# Patient Record
Sex: FEMALE | Employment: OTHER | URBAN - METROPOLITAN AREA
[De-identification: names, ages, dates, MRNs, and addresses within clinical notes are randomized per-mention and may not be internally consistent; named-entity substitution may affect disease eponyms.]

---

## 2018-02-03 ENCOUNTER — APPOINTMENT (OUTPATIENT)
Dept: CT IMAGING | Age: 68
End: 2018-02-03
Attending: PHYSICIAN ASSISTANT
Payer: MEDICARE

## 2018-02-03 ENCOUNTER — HOSPITAL ENCOUNTER (EMERGENCY)
Age: 68
Discharge: HOME | End: 2018-02-03
Attending: EMERGENCY MEDICINE
Payer: MEDICARE

## 2018-02-03 VITALS
OXYGEN SATURATION: 95 % | TEMPERATURE: 98.6 F | WEIGHT: 178 LBS | RESPIRATION RATE: 20 BRPM | HEART RATE: 85 BPM | HEIGHT: 63 IN | BODY MASS INDEX: 31.54 KG/M2 | DIASTOLIC BLOOD PRESSURE: 66 MMHG | SYSTOLIC BLOOD PRESSURE: 165 MMHG

## 2018-02-03 DIAGNOSIS — R51.9 ACUTE NONINTRACTABLE HEADACHE, UNSPECIFIED HEADACHE TYPE: Primary | ICD-10-CM

## 2018-02-03 LAB
ALBUMIN SERPL-MCNC: 3.7 G/DL (ref 3.4–5)
ALBUMIN/GLOB SERPL: 0.8 {RATIO} (ref 0.8–1.7)
ALP SERPL-CCNC: 161 U/L (ref 45–117)
ALT SERPL-CCNC: 36 U/L (ref 13–56)
ANION GAP SERPL CALC-SCNC: 10 MMOL/L (ref 3–18)
APPEARANCE UR: CLEAR
AST SERPL-CCNC: 24 U/L (ref 15–37)
BACTERIA URNS QL MICRO: ABNORMAL /HPF
BASOPHILS # BLD: 0 K/UL (ref 0–0.06)
BASOPHILS NFR BLD: 0 % (ref 0–2)
BILIRUB DIRECT SERPL-MCNC: 0.2 MG/DL (ref 0–0.2)
BILIRUB SERPL-MCNC: 0.8 MG/DL (ref 0.2–1)
BILIRUB UR QL: NEGATIVE
BUN SERPL-MCNC: 9 MG/DL (ref 7–18)
BUN/CREAT SERPL: 8 (ref 12–20)
CALCIUM SERPL-MCNC: 9.6 MG/DL (ref 8.5–10.1)
CHLORIDE SERPL-SCNC: 98 MMOL/L (ref 100–108)
CO2 SERPL-SCNC: 30 MMOL/L (ref 21–32)
COLOR UR: YELLOW
CREAT SERPL-MCNC: 1.06 MG/DL (ref 0.6–1.3)
DIFFERENTIAL METHOD BLD: ABNORMAL
EOSINOPHIL # BLD: 0 K/UL (ref 0–0.4)
EOSINOPHIL NFR BLD: 1 % (ref 0–5)
EPITH CASTS URNS QL MICRO: ABNORMAL /LPF (ref 0–5)
ERYTHROCYTE [DISTWIDTH] IN BLOOD BY AUTOMATED COUNT: 14.4 % (ref 11.6–14.5)
GLOBULIN SER CALC-MCNC: 4.7 G/DL (ref 2–4)
GLUCOSE SERPL-MCNC: 299 MG/DL (ref 74–99)
GLUCOSE UR STRIP.AUTO-MCNC: >1000 MG/DL
HCT VFR BLD AUTO: 42.6 % (ref 35–45)
HGB BLD-MCNC: 13.7 G/DL (ref 12–16)
HGB UR QL STRIP: NEGATIVE
KETONES UR QL STRIP.AUTO: 40 MG/DL
LEUKOCYTE ESTERASE UR QL STRIP.AUTO: NEGATIVE
LYMPHOCYTES # BLD: 1 K/UL (ref 0.9–3.6)
LYMPHOCYTES NFR BLD: 13 % (ref 21–52)
MAGNESIUM SERPL-MCNC: 1.6 MG/DL (ref 1.6–2.6)
MCH RBC QN AUTO: 25.8 PG (ref 24–34)
MCHC RBC AUTO-ENTMCNC: 32.2 G/DL (ref 31–37)
MCV RBC AUTO: 80.4 FL (ref 74–97)
MONOCYTES # BLD: 0.3 K/UL (ref 0.05–1.2)
MONOCYTES NFR BLD: 3 % (ref 3–10)
NEUTS SEG # BLD: 6.4 K/UL (ref 1.8–8)
NEUTS SEG NFR BLD: 83 % (ref 40–73)
NITRITE UR QL STRIP.AUTO: NEGATIVE
PH UR STRIP: 7 [PH] (ref 5–8)
PLATELET # BLD AUTO: 229 K/UL (ref 135–420)
PMV BLD AUTO: 9.8 FL (ref 9.2–11.8)
POTASSIUM SERPL-SCNC: 3.5 MMOL/L (ref 3.5–5.5)
PROT SERPL-MCNC: 8.4 G/DL (ref 6.4–8.2)
PROT UR STRIP-MCNC: 100 MG/DL
RBC # BLD AUTO: 5.3 M/UL (ref 4.2–5.3)
RBC #/AREA URNS HPF: ABNORMAL /HPF (ref 0–5)
SODIUM SERPL-SCNC: 138 MMOL/L (ref 136–145)
SP GR UR REFRACTOMETRY: >1.03 (ref 1–1.03)
UROBILINOGEN UR QL STRIP.AUTO: 0.2 EU/DL (ref 0.2–1)
WBC # BLD AUTO: 7.8 K/UL (ref 4.6–13.2)
WBC URNS QL MICRO: ABNORMAL /HPF (ref 0–4)

## 2018-02-03 PROCEDURE — 80076 HEPATIC FUNCTION PANEL: CPT | Performed by: PHYSICIAN ASSISTANT

## 2018-02-03 PROCEDURE — 70450 CT HEAD/BRAIN W/O DYE: CPT

## 2018-02-03 PROCEDURE — 85025 COMPLETE CBC W/AUTO DIFF WBC: CPT | Performed by: PHYSICIAN ASSISTANT

## 2018-02-03 PROCEDURE — 74011250636 HC RX REV CODE- 250/636: Performed by: PHYSICIAN ASSISTANT

## 2018-02-03 PROCEDURE — 83735 ASSAY OF MAGNESIUM: CPT | Performed by: PHYSICIAN ASSISTANT

## 2018-02-03 PROCEDURE — 96375 TX/PRO/DX INJ NEW DRUG ADDON: CPT

## 2018-02-03 PROCEDURE — 80048 BASIC METABOLIC PNL TOTAL CA: CPT | Performed by: PHYSICIAN ASSISTANT

## 2018-02-03 PROCEDURE — 99282 EMERGENCY DEPT VISIT SF MDM: CPT

## 2018-02-03 PROCEDURE — 96361 HYDRATE IV INFUSION ADD-ON: CPT

## 2018-02-03 PROCEDURE — 96374 THER/PROPH/DIAG INJ IV PUSH: CPT

## 2018-02-03 PROCEDURE — 81001 URINALYSIS AUTO W/SCOPE: CPT | Performed by: PHYSICIAN ASSISTANT

## 2018-02-03 RX ORDER — SODIUM CHLORIDE 9 MG/ML
1000 INJECTION, SOLUTION INTRAVENOUS ONCE
Status: COMPLETED | OUTPATIENT
Start: 2018-02-03 | End: 2018-02-03

## 2018-02-03 RX ORDER — NAPROXEN 375 MG/1
375 TABLET ORAL 2 TIMES DAILY WITH MEALS
Qty: 20 TAB | Refills: 0 | Status: ON HOLD | OUTPATIENT
Start: 2018-02-03 | End: 2019-12-16

## 2018-02-03 RX ORDER — HYDROMORPHONE HYDROCHLORIDE 1 MG/ML
1 INJECTION, SOLUTION INTRAMUSCULAR; INTRAVENOUS; SUBCUTANEOUS ONCE
Status: COMPLETED | OUTPATIENT
Start: 2018-02-03 | End: 2018-02-03

## 2018-02-03 RX ORDER — PROMETHAZINE HYDROCHLORIDE 25 MG/1
25 TABLET ORAL
Qty: 12 TAB | Refills: 0 | Status: ON HOLD | OUTPATIENT
Start: 2018-02-03 | End: 2019-12-16

## 2018-02-03 RX ORDER — ONDANSETRON 2 MG/ML
4 INJECTION INTRAMUSCULAR; INTRAVENOUS
Status: COMPLETED | OUTPATIENT
Start: 2018-02-03 | End: 2018-02-03

## 2018-02-03 RX ADMIN — HYDROMORPHONE HYDROCHLORIDE 1 MG: 1 INJECTION, SOLUTION INTRAMUSCULAR; INTRAVENOUS; SUBCUTANEOUS at 15:07

## 2018-02-03 RX ADMIN — ONDANSETRON 4 MG: 2 INJECTION INTRAMUSCULAR; INTRAVENOUS at 15:07

## 2018-02-03 RX ADMIN — SODIUM CHLORIDE 1000 ML: 900 INJECTION, SOLUTION INTRAVENOUS at 16:44

## 2018-02-03 NOTE — DISCHARGE INSTRUCTIONS
54     Headache: Care Instructions  Your Care Instructions    Headaches have many possible causes. Most headaches aren't a sign of a more serious problem, and they will get better on their own. Home treatment may help you feel better faster. The doctor has checked you carefully, but problems can develop later. If you notice any problems or new symptoms, get medical treatment right away. Follow-up care is a key part of your treatment and safety. Be sure to make and go to all appointments, and call your doctor if you are having problems. It's also a good idea to know your test results and keep a list of the medicines you take. How can you care for yourself at home? · Do not drive if you have taken a prescription pain medicine. · Rest in a quiet, dark room until your headache is gone. Close your eyes and try to relax or go to sleep. Don't watch TV or read. · Put a cold, moist cloth or cold pack on the painful area for 10 to 20 minutes at a time. Put a thin cloth between the cold pack and your skin. · Use a warm, moist towel or a heating pad set on low to relax tight shoulder and neck muscles. · Have someone gently massage your neck and shoulders. · Take pain medicines exactly as directed. ¨ If the doctor gave you a prescription medicine for pain, take it as prescribed. ¨ If you are not taking a prescription pain medicine, ask your doctor if you can take an over-the-counter medicine. · Be careful not to take pain medicine more often than the instructions allow, because you may get worse or more frequent headaches when the medicine wears off. · Do not ignore new symptoms that occur with a headache, such as a fever, weakness or numbness, vision changes, or confusion. These may be signs of a more serious problem. To prevent headaches  · Keep a headache diary so you can figure out what triggers your headaches. Avoiding triggers may help you prevent headaches.  Record when each headache began, how long it lasted, and what the pain was like (throbbing, aching, stabbing, or dull). Write down any other symptoms you had with the headache, such as nausea, flashing lights or dark spots, or sensitivity to bright light or loud noise. Note if the headache occurred near your period. List anything that might have triggered the headache, such as certain foods (chocolate, cheese, wine) or odors, smoke, bright light, stress, or lack of sleep. · Find healthy ways to deal with stress. Headaches are most common during or right after stressful times. Take time to relax before and after you do something that has caused a headache in the past.  · Try to keep your muscles relaxed by keeping good posture. Check your jaw, face, neck, and shoulder muscles for tension, and try relaxing them. When sitting at a desk, change positions often, and stretch for 30 seconds each hour. · Get plenty of sleep and exercise. · Eat regularly and well. Long periods without food can trigger a headache. · Treat yourself to a massage. Some people find that regular massages are very helpful in relieving tension. · Limit caffeine by not drinking too much coffee, tea, or soda. But don't quit caffeine suddenly, because that can also give you headaches. · Reduce eyestrain from computers by blinking frequently and looking away from the computer screen every so often. Make sure you have proper eyewear and that your monitor is set up properly, about an arm's length away. · Seek help if you have depression or anxiety. Your headaches may be linked to these conditions. Treatment can both prevent headaches and help with symptoms of anxiety or depression. When should you call for help? Call 911 anytime you think you may need emergency care. For example, call if:  ? · You have signs of a stroke. These may include:  ¨ Sudden numbness, paralysis, or weakness in your face, arm, or leg, especially on only one side of your body. ¨ Sudden vision changes.   ¨ Sudden trouble speaking. ¨ Sudden confusion or trouble understanding simple statements. ¨ Sudden problems with walking or balance. ¨ A sudden, severe headache that is different from past headaches. ?Call your doctor now or seek immediate medical care if:  ? · You have a new or worse headache. ? · Your headache gets much worse. Where can you learn more? Go to http://alfredo-susy.info/. Enter M271 in the search box to learn more about \"Headache: Care Instructions. \"  Current as of: October 14, 2016  Content Version: 11.4  © 7373-8865 [employer], Incorporated. Care instructions adapted under license by [employer] (which disclaims liability or warranty for this information). If you have questions about a medical condition or this instruction, always ask your healthcare professional. [address] any warranty or liability for your use of this information.

## 2018-02-03 NOTE — ED PROVIDER NOTES
EMERGENCY DEPARTMENT HISTORY AND PHYSICAL EXAM    Date: 2/3/2018  Patient Name: Ritika Norwood    History of Presenting Illness     Chief Complaint   Patient presents with    Headache         History Provided By: Patient and Patient's Mother    Chief Complaint: headache  Duration: 2 Weeks  Timing:  Waxing and Waning  Location: right side of head, around right eye  Quality: Pressure  Severity: 10 out of 10  Modifying Factors: none  Associated Symptoms: denies any other associated signs or symptoms      Additional History (Context): Ritika Norwood is a 75 y.o. female with diabetes, hypertension and hyperlipidemia who presents with headache. Initial onset 2 weeks ago. HA was right sided, around the right eye and down the right side of neck. Pt states she went to [facility] and was dx with migraine. HA resolved, then returned this morning and the migraine medications were not working. HA this morning woke her from sleep. She reported photophobia and nausea. Has family hx of migraines. Denies any other associated complaints. PCP: PROVIDER UNKNOWN    Current Outpatient Prescriptions   Medication Sig Dispense Refill    aspirin delayed-release (ECOTRIN LOW STRENGTH) 81 mg tablet Take 81 mg by mouth daily.  pantoprazole (PROTONIX) 20 mg tablet Take 20 mg by mouth.  lisinopril (PRINIVIL, ZESTRIL) 10 mg tablet Take 10 mg by mouth.  atorvastatin (LIPITOR) 80 mg tablet Take 80 mg by mouth.  indomethacin (INDOCIN) 50 mg capsule       metFORMIN ER (GLUCOPHAGE XR) 750 mg tablet Take  by mouth.  pantoprazole (PROTONIX) 40 mg tablet       methocarbamol (ROBAXIN) 500 mg tablet       indomethacin (INDOCIN) 50 mg capsule       mirabegron ER (MYRBETRIQ) 50 mg ER tablet Take 1 Tab by mouth daily. 90 Tab 3    Omeprazole delayed release (PRILOSEC D/R) 20 mg tablet Take 20 mg by mouth daily.  metFORMIN (GLUCOPHAGE) 500 mg tablet Take 500 mg by mouth two (2) times daily (with meals).       insulin (NOVOLOG MIX 70-30 FLEXPEN) 100 unit/mL (70-30) flex pen 7 Units by SubCUTAneous route three (3) times daily (with meals).  insulin glargine (LANTUS) 100 unit/mL injection 30 Units by SubCUTAneous route nightly.  lisinopril-hydrochlorothiazide (PRINZIDE, ZESTORETIC) 20-12.5 mg per tablet Take 2 Tabs by mouth daily. Past History     Past Medical History:  Past Medical History:   Diagnosis Date    Diabetes ([address]     Hypercholesteremia     Hypertension     Nausea & vomiting     UTI (urinary tract infection)        Past Surgical History:  Past Surgical History:   Procedure Laterality Date    HX APPENDECTOMY      HX HYSTERECTOMY      HX ADELA AND BSO         Family History:  No family history on file. Social History:  Social History   Substance Use Topics    Smoking status: Former Smoker     Quit date: 2009    Smokeless tobacco: Never Used    Alcohol use No       Allergies: Allergies   Allergen Reactions    Darvon [Propoxyphene] Nausea and Vomiting    Sulfabenzamide Hives and Rash         Review of Systems   Review of Systems   Constitutional: Negative for chills and fever. HENT: Negative. Respiratory: Negative. Cardiovascular: Negative. Gastrointestinal: Negative. Genitourinary: Negative. Musculoskeletal: Negative. Neurological: Positive for headaches. Negative for dizziness and weakness. All other systems reviewed and are negative. All Other Systems Negative  Physical Exam     Vitals:    02/03/18 1414   BP: (!) 178/104   Pulse: 85   Resp: 20   Temp: 98.6 °F (37 °C)   SpO2: 98%   Weight: 80.7 kg (178 lb)   Height: 5' 3\" (1.6 m)     Physical Exam   Constitutional: She is oriented to person, place, and time. She appears well-developed and well-nourished. No distress. HENT:   Head: Normocephalic and atraumatic.    Right Ear: Hearing, tympanic membrane, external ear and ear canal normal.   Left Ear: Hearing, tympanic membrane, external ear and ear canal normal. Nose: Nose normal.   Mouth/Throat: Uvula is midline, oropharynx is clear and moist and mucous membranes are normal.   Eyes: Conjunctivae and EOM are normal. Pupils are equal, round, and reactive to light. Right eye exhibits no discharge. Left eye exhibits no discharge. Neck: Normal range of motion. Neck supple. Cardiovascular: Normal rate and regular rhythm. Pulmonary/Chest: Effort normal and breath sounds normal. She has no wheezes. Musculoskeletal: Normal range of motion. Neurological: She is alert and oriented to person, place, and time. No sensory deficit. She displays a negative Romberg sign. Coordination and gait normal.   Cranial nerves grossly intact   Skin: Skin is warm and dry. She is not diaphoretic. Psychiatric: She has a normal mood and affect. Diagnostic Study Results     Labs -     Recent Results (from the past 12 hour(s))   CBC WITH AUTOMATED DIFF    Collection Time: 02/03/18  3:00 PM   Result Value Ref Range    WBC 7.8 4.6 - 13.2 K/uL    RBC 5.30 4.20 - 5.30 M/uL    HGB 13.7 12.0 - 16.0 g/dL    HCT 42.6 35.0 - 45.0 %    MCV 80.4 74.0 - 97.0 FL    MCH 25.8 24.0 - 34.0 PG    MCHC 32.2 31.0 - 37.0 g/dL    RDW 14.4 11.6 - 14.5 %    PLATELET 858 471 - 743 K/uL    MPV 9.8 9.2 - 11.8 FL    NEUTROPHILS 83 (H) 40 - 73 %    LYMPHOCYTES 13 (L) 21 - 52 %    MONOCYTES 3 3 - 10 %    EOSINOPHILS 1 0 - 5 %    BASOPHILS 0 0 - 2 %    ABS. NEUTROPHILS 6.4 1.8 - 8.0 K/UL    ABS. LYMPHOCYTES 1.0 0.9 - 3.6 K/UL    ABS. MONOCYTES 0.3 0.05 - 1.2 K/UL    ABS. EOSINOPHILS 0.0 0.0 - 0.4 K/UL    ABS. BASOPHILS 0.0 0.0 - 0.06 K/UL    DF AUTOMATED     HEPATIC FUNCTION PANEL    Collection Time: 02/03/18  3:00 PM   Result Value Ref Range    Protein, total 8.4 (H) 6.4 - 8.2 g/dL    Albumin 3.7 3.4 - 5.0 g/dL    Globulin 4.7 (H) 2.0 - 4.0 g/dL    A-G Ratio 0.8 0.8 - 1.7      Bilirubin, total 0.8 0.2 - 1.0 MG/DL    Bilirubin, direct 0.2 0.0 - 0.2 MG/DL    Alk.  phosphatase 161 (H) 45 - 117 U/L    AST (SGOT) 24 15 - 37 U/L    ALT (SGPT) 36 13 - 56 U/L   METABOLIC PANEL, BASIC    Collection Time: 02/03/18  3:00 PM   Result Value Ref Range    Sodium 138 136 - 145 mmol/L    Potassium 3.5 3.5 - 5.5 mmol/L    Chloride 98 (L) 100 - 108 mmol/L    CO2 30 21 - 32 mmol/L    Anion gap 10 3.0 - 18 mmol/L    Glucose 299 (H) 74 - 99 mg/dL    BUN 9 7.0 - 18 MG/DL    Creatinine 1.06 0.6 - 1.3 MG/DL    BUN/Creatinine ratio 8 (L) 12 - 20      GFR est AA >60 >60 ml/min/1.73m2    GFR est non-AA 52 (L) >60 ml/min/1.73m2    Calcium 9.6 8.5 - 10.1 MG/DL   MAGNESIUM    Collection Time: 02/03/18  3:00 PM   Result Value Ref Range    Magnesium 1.6 1.6 - 2.6 mg/dL   URINALYSIS W/ RFLX MICROSCOPIC    Collection Time: 02/03/18  5:20 PM   Result Value Ref Range    Color YELLOW      Appearance CLEAR      Specific gravity >1.030 (H) 1.005 - 1.030    pH (UA) 7.0 5.0 - 8.0      Protein 100 (A) NEG mg/dL    Glucose >1000 (A) NEG mg/dL    Ketone 40 (A) NEG mg/dL    Bilirubin NEGATIVE  NEG      Blood NEGATIVE  NEG      Urobilinogen 0.2 0.2 - 1.0 EU/dL    Nitrites NEGATIVE  NEG      Leukocyte Esterase NEGATIVE  NEG     URINE MICROSCOPIC ONLY    Collection Time: 02/03/18  5:20 PM   Result Value Ref Range    WBC 0 to 3 0 - 4 /hpf    RBC 0 to 3 0 - 5 /hpf    Epithelial cells 2+ 0 - 5 /lpf    Bacteria 1+ (A) NEG /hpf       Radiologic Studies -   CT HEAD WO CONT   Final Result        CT Results  (Last 48 hours)               02/03/18 1518  CT HEAD WO CONT Final result    Impression:  IMPRESSION:            No acute intracranial process. All CT scans at this facility are performed using dose optimization technique as   appropriate to the performed exam, to include automated exposure control,   adjustment of the mA and/or kV according to patient's size (Including   appropriate matching for site-specific examinations), or use of iterative   reconstruction technique. Narrative:  CT OF THE BRAIN       CPT CODE: 47739       COMPARISON: None. INDICATIONS: Right-sided headache. TECHNIQUE: Axial sections through the brain at 5 mm collimation without IV   contrast are obtained. FINDINGS:         The ventricles are of normal size and configuration without midline shift. .   The brain parenchyma is generally of normal attenuation. . Gray-white   differentiation is satisfactory. No acute hemorrhage is evident. .   No mass lesions are evident. There are no pathologic calcifications. .   There are no pathologic fluid collections. .       The visible portions of the paranasal sinuses are clear. .               CXR Results  (Last 48 hours)    None            Medical Decision Making   I am the first provider for this patient. I reviewed the vital signs, available nursing notes, past medical history, past surgical history, family history and social history. Vital Signs-Reviewed the patient's vital signs. Pulse Oximetry Analysis - 98% on RA    Records Reviewed: Nursing Notes    Procedures:  Procedures    Provider Notes (Medical Decision Making):   Pt c/o right sided HA, around right eye initial onset 2 weeks ago, then resolved and occurred again this morning. Pt given dilaudid and zofran prior to my eval due to her apparently having a lot of pain and trouble being still in wheelchair. Pt pain free upon my initial evaluation and re-evaluation. Did state that she was under a lot of stress at home the past several weeks. Labs, CT reassuring. Neuro exam unremarkable. MED RECONCILIATION:  No current facility-administered medications for this encounter. Current Outpatient Prescriptions   Medication Sig    aspirin delayed-release (ECOTRIN LOW STRENGTH) 81 mg tablet Take 81 mg by mouth daily.  pantoprazole (PROTONIX) 20 mg tablet Take 20 mg by mouth.  lisinopril (PRINIVIL, ZESTRIL) 10 mg tablet Take 10 mg by mouth.  atorvastatin (LIPITOR) 80 mg tablet Take 80 mg by mouth.     indomethacin (INDOCIN) 50 mg capsule     metFORMIN ER (GLUCOPHAGE XR) 750 mg tablet Take  by mouth.  pantoprazole (PROTONIX) 40 mg tablet     methocarbamol (ROBAXIN) 500 mg tablet     indomethacin (INDOCIN) 50 mg capsule     mirabegron ER (MYRBETRIQ) 50 mg ER tablet Take 1 Tab by mouth daily.  Omeprazole delayed release (PRILOSEC D/R) 20 mg tablet Take 20 mg by mouth daily.  metFORMIN (GLUCOPHAGE) 500 mg tablet Take 500 mg by mouth two (2) times daily (with meals).  insulin (NOVOLOG MIX 70-30 FLEXPEN) 100 unit/mL (70-30) flex pen 7 Units by SubCUTAneous route three (3) times daily (with meals).  insulin glargine (LANTUS) 100 unit/mL injection 30 Units by SubCUTAneous route nightly.  lisinopril-hydrochlorothiazide (PRINZIDE, ZESTORETIC) 20-12.5 mg per tablet Take 2 Tabs by mouth daily. Disposition:  discharged    DISCHARGE NOTE:     Pt has been reexamined. Pt is feeling better. Has been sleeping since meds were given. Patient has no new complaints, changes, or physical findings. Care plan outlined and precautions discussed. Results of ct and labs were reviewed with the patient. All medications were reviewed with the patient; will d/c home. All of pt's questions and concerns were addressed. Patient was instructed and agrees to follow up with PCP, as well as to return to the ED upon further deterioration. Patient is ready to go home. Follow-up Information     Follow up With Details Comments [address]  For follow-up [address] [facility] [address] [phone] [address] [facility] EMERGENCY DEPT  If symptoms worsen [address] [facility]  [phone]          Current Discharge Medication List      CONTINUE these medications which have NOT CHANGED    Details   aspirin delayed-release (ECOTRIN LOW STRENGTH) 81 mg tablet Take 81 mg by mouth daily.       !! pantoprazole (PROTONIX) 20 mg tablet Take 20 mg by mouth.      lisinopril (PRINIVIL, ZESTRIL) 10 mg tablet Take 10 mg by mouth. atorvastatin (LIPITOR) 80 mg tablet Take 80 mg by mouth. !! indomethacin (INDOCIN) 50 mg capsule       metFORMIN ER (GLUCOPHAGE XR) 750 mg tablet Take  by mouth. !! pantoprazole (PROTONIX) 40 mg tablet       methocarbamol (ROBAXIN) 500 mg tablet       !! indomethacin (INDOCIN) 50 mg capsule       mirabegron ER (MYRBETRIQ) 50 mg ER tablet Take 1 Tab by mouth daily. Qty: 90 Tab, Refills: 3      Omeprazole delayed release (PRILOSEC D/R) 20 mg tablet Take 20 mg by mouth daily. metFORMIN (GLUCOPHAGE) 500 mg tablet Take 500 mg by mouth two (2) times daily (with meals). insulin (NOVOLOG MIX 70-30 FLEXPEN) 100 unit/mL (70-30) flex pen 7 Units by SubCUTAneous route three (3) times daily (with meals). insulin glargine (LANTUS) 100 unit/mL injection 30 Units by SubCUTAneous route nightly. lisinopril-hydrochlorothiazide (PRINZIDE, ZESTORETIC) 20-12.5 mg per tablet Take 2 Tabs by mouth daily. !! - Potential duplicate medications found. Please discuss with provider. Diagnosis     Clinical Impression:   1.  Acute nonintractable headache, unspecified headache type

## 2018-02-03 NOTE — ED TRIAGE NOTES
Pt c/o right sided headache for 2 weeks. [facility] and dx'd with [facility]raines. Pt can't sit still in wheelchair. Keeps acting like she is talking to someone who is not there. Pt states she is talking to GOD.

## 2018-02-04 NOTE — ED NOTES
Pt states ready for discharge. Pt states she will follow up with PCP as instructed by provider. Pt appears in NOAD. I have reviewed discharge instructions with the patient. Prescriptions were reviewed with patient instructed not to drink alcohol, drive a car, or operate heavy machinery while taking this medicine. The patient verbalized understanding. Patient seen leaving ED ambulatory without difficulty or need for assistance, with S/O in no sign of distress. Patient armband removed and shredded    Current Discharge Medication List      START taking these medications    Details   promethazine (PHENERGAN) 25 mg tablet Take 1 Tab by mouth every six (6) hours as needed. Qty: 12 Tab, Refills: 0      naproxen (NAPROSYN) 375 mg tablet Take 1 Tab by mouth two (2) times daily (with meals). Qty: 20 Tab, Refills: 0         CONTINUE these medications which have NOT CHANGED    Details   aspirin delayed-release (ECOTRIN LOW STRENGTH) 81 mg tablet Take 81 mg by mouth daily. !! pantoprazole (PROTONIX) 20 mg tablet Take 20 mg by mouth.      lisinopril (PRINIVIL, ZESTRIL) 10 mg tablet Take 10 mg by mouth. atorvastatin (LIPITOR) 80 mg tablet Take 80 mg by mouth. !! indomethacin (INDOCIN) 50 mg capsule       metFORMIN ER (GLUCOPHAGE XR) 750 mg tablet Take  by mouth. !! pantoprazole (PROTONIX) 40 mg tablet       methocarbamol (ROBAXIN) 500 mg tablet       !! indomethacin (INDOCIN) 50 mg capsule       mirabegron ER (MYRBETRIQ) 50 mg ER tablet Take 1 Tab by mouth daily. Qty: 90 Tab, Refills: 3      Omeprazole delayed release (PRILOSEC D/R) 20 mg tablet Take 20 mg by mouth daily. metFORMIN (GLUCOPHAGE) 500 mg tablet Take 500 mg by mouth two (2) times daily (with meals). insulin (NOVOLOG MIX 70-30 FLEXPEN) 100 unit/mL (70-30) flex pen 7 Units by SubCUTAneous route three (3) times daily (with meals). insulin glargine (LANTUS) 100 unit/mL injection 30 Units by SubCUTAneous route nightly. lisinopril-hydrochlorothiazide (PRINZIDE, ZESTORETIC) 20-12.5 mg per tablet Take 2 Tabs by mouth daily. !! - Potential duplicate medications found. Please discuss with provider.

## 2019-10-17 ENCOUNTER — HOSPITAL ENCOUNTER (EMERGENCY)
Age: 69
Discharge: HOME | End: 2019-10-17
Attending: EMERGENCY MEDICINE
Payer: MEDICARE

## 2019-10-17 VITALS
OXYGEN SATURATION: 100 % | HEART RATE: 70 BPM | TEMPERATURE: 97.7 F | BODY MASS INDEX: 27.83 KG/M2 | DIASTOLIC BLOOD PRESSURE: 88 MMHG | HEIGHT: 64 IN | RESPIRATION RATE: 16 BRPM | SYSTOLIC BLOOD PRESSURE: 156 MMHG | WEIGHT: 163 LBS

## 2019-10-17 DIAGNOSIS — R73.9 HYPERGLYCEMIA: Primary | ICD-10-CM

## 2019-10-17 LAB
ADMINISTERED INITIALS, ADMINIT: NORMAL
ADMINISTERED INITIALS, ADMINIT: NORMAL
ANION GAP SERPL CALC-SCNC: 7 MMOL/L (ref 3–18)
ATRIAL RATE: 73 BPM
BASOPHILS # BLD: 0 K/UL (ref 0–0.1)
BASOPHILS NFR BLD: 1 % (ref 0–2)
BUN SERPL-MCNC: 13 MG/DL (ref 7–18)
BUN/CREAT SERPL: 12 (ref 12–20)
CALCIUM SERPL-MCNC: 9.1 MG/DL (ref 8.5–10.1)
CALCULATED P AXIS, ECG09: 63 DEGREES
CALCULATED R AXIS, ECG10: -16 DEGREES
CALCULATED T AXIS, ECG11: 52 DEGREES
CHLORIDE SERPL-SCNC: 102 MMOL/L (ref 100–111)
CO2 SERPL-SCNC: 25 MMOL/L (ref 21–32)
CREAT SERPL-MCNC: 1.07 MG/DL (ref 0.6–1.3)
D50 ADMINISTERED, D50ADM: 0 ML
D50 ADMINISTERED, D50ADM: 0 ML
D50 ORDER, D50ORD: 0 ML
D50 ORDER, D50ORD: 0 ML
DIAGNOSIS, 93000: NORMAL
DIFFERENTIAL METHOD BLD: NORMAL
EOSINOPHIL # BLD: 0.1 K/UL (ref 0–0.4)
EOSINOPHIL NFR BLD: 1 % (ref 0–5)
ERYTHROCYTE [DISTWIDTH] IN BLOOD BY AUTOMATED COUNT: 13.9 % (ref 11.6–14.5)
GLUCOSE BLD STRIP.AUTO-MCNC: 204 MG/DL (ref 70–110)
GLUCOSE BLD STRIP.AUTO-MCNC: 373 MG/DL (ref 70–110)
GLUCOSE BLD STRIP.AUTO-MCNC: 481 MG/DL (ref 70–110)
GLUCOSE SERPL-MCNC: 505 MG/DL (ref 74–99)
GLUCOSE, GLC: 373 MG/DL
GLUCOSE, GLC: 481 MG/DL
HCT VFR BLD AUTO: 41 % (ref 35–45)
HGB BLD-MCNC: 13.6 G/DL (ref 12–16)
HIGH TARGET, HITG: 180 MG/DL
HIGH TARGET, HITG: 180 MG/DL
INSULIN ADMINSTERED, INSADM: 3.1 UNITS/HOUR
INSULIN ADMINSTERED, INSADM: 8.4 UNITS/HOUR
INSULIN ORDER, INSORD: 3.1 UNITS/HOUR
INSULIN ORDER, INSORD: 8.4 UNITS/HOUR
LOW TARGET, LOT: 140 MG/DL
LOW TARGET, LOT: 140 MG/DL
LYMPHOCYTES # BLD: 1.6 K/UL (ref 0.9–3.6)
LYMPHOCYTES NFR BLD: 28 % (ref 21–52)
MAGNESIUM SERPL-MCNC: 2 MG/DL (ref 1.6–2.6)
MCH RBC QN AUTO: 26.9 PG (ref 24–34)
MCHC RBC AUTO-ENTMCNC: 33.2 G/DL (ref 31–37)
MCV RBC AUTO: 81.2 FL (ref 74–97)
MINUTES UNTIL NEXT BG, NBG: 60 MIN
MINUTES UNTIL NEXT BG, NBG: 60 MIN
MONOCYTES # BLD: 0.3 K/UL (ref 0.05–1.2)
MONOCYTES NFR BLD: 5 % (ref 3–10)
MULTIPLIER, MUL: 0.01
MULTIPLIER, MUL: 0.02
NEUTS SEG # BLD: 3.7 K/UL (ref 1.8–8)
NEUTS SEG NFR BLD: 65 % (ref 40–73)
ORDER INITIALS, ORDINIT: NORMAL
ORDER INITIALS, ORDINIT: NORMAL
P-R INTERVAL, ECG05: 180 MS
PLATELET # BLD AUTO: 223 K/UL (ref 135–420)
PMV BLD AUTO: 10.7 FL (ref 9.2–11.8)
POTASSIUM SERPL-SCNC: 3.8 MMOL/L (ref 3.5–5.5)
Q-T INTERVAL, ECG07: 402 MS
QRS DURATION, ECG06: 90 MS
QTC CALCULATION (BEZET), ECG08: 442 MS
RBC # BLD AUTO: 5.05 M/UL (ref 4.2–5.3)
SODIUM SERPL-SCNC: 134 MMOL/L (ref 136–145)
VENTRICULAR RATE, ECG03: 73 BPM
WBC # BLD AUTO: 5.7 K/UL (ref 4.6–13.2)

## 2019-10-17 PROCEDURE — 80048 BASIC METABOLIC PNL TOTAL CA: CPT

## 2019-10-17 PROCEDURE — 74011000258 HC RX REV CODE- 258: Performed by: EMERGENCY MEDICINE

## 2019-10-17 PROCEDURE — 85025 COMPLETE CBC W/AUTO DIFF WBC: CPT

## 2019-10-17 PROCEDURE — 93005 ELECTROCARDIOGRAM TRACING: CPT

## 2019-10-17 PROCEDURE — 82962 GLUCOSE BLOOD TEST: CPT

## 2019-10-17 PROCEDURE — 99285 EMERGENCY DEPT VISIT HI MDM: CPT

## 2019-10-17 PROCEDURE — 96366 THER/PROPH/DIAG IV INF ADDON: CPT

## 2019-10-17 PROCEDURE — 96365 THER/PROPH/DIAG IV INF INIT: CPT

## 2019-10-17 PROCEDURE — 83735 ASSAY OF MAGNESIUM: CPT

## 2019-10-17 PROCEDURE — 74011636637 HC RX REV CODE- 636/637: Performed by: EMERGENCY MEDICINE

## 2019-10-17 PROCEDURE — 74011250636 HC RX REV CODE- 250/636: Performed by: EMERGENCY MEDICINE

## 2019-10-17 RX ORDER — MAGNESIUM SULFATE 100 %
4 CRYSTALS MISCELLANEOUS AS NEEDED
Status: DISCONTINUED | OUTPATIENT
Start: 2019-10-17 | End: 2019-10-17 | Stop reason: HOSPADM

## 2019-10-17 RX ORDER — SODIUM CHLORIDE 9 MG/ML
1000 INJECTION, SOLUTION INTRAVENOUS ONCE
Status: COMPLETED | OUTPATIENT
Start: 2019-10-17 | End: 2019-10-17

## 2019-10-17 RX ORDER — DEXTROSE 50 % IN WATER (D50W) INTRAVENOUS SYRINGE
25-50 AS NEEDED
Status: DISCONTINUED | OUTPATIENT
Start: 2019-10-17 | End: 2019-10-17 | Stop reason: HOSPADM

## 2019-10-17 RX ADMIN — SODIUM CHLORIDE 8.4 UNITS/HR: 0.9 INJECTION INTRAVENOUS at 14:45

## 2019-10-17 RX ADMIN — SODIUM CHLORIDE 3.1 UNITS/HR: 0.9 INJECTION INTRAVENOUS at 15:49

## 2019-10-17 RX ADMIN — SODIUM CHLORIDE 1000 ML: 900 INJECTION, SOLUTION INTRAVENOUS at 14:48

## 2019-10-17 NOTE — DISCHARGE INSTRUCTIONS
Patient Education        Learning About High Blood Sugar  What is high blood sugar? Your body turns the food you eat into glucose (sugar), which it uses for energy. But if your body isn't able to use the sugar right away, it can build up in your blood and lead to high blood sugar. When the amount of sugar in your blood stays too high for too much of the time, you may have diabetes. Diabetes is a disease that can cause serious health problems. The good news is that lifestyle changes may help you get your blood sugar back to normal and avoid or delay diabetes. What causes high blood sugar? Sugar (glucose) can build up in your blood if you:  · Are overweight. · Have a family history of diabetes. · Take certain medicines, such as steroids. What are the symptoms? Having high blood sugar may not cause any symptoms at all. Or it may make you feel very thirsty or very hungry. You may also urinate more often than usual, have blurry vision, or lose weight without trying. How is high blood sugar treated? You can take steps to lower your blood sugar level if you understand what makes it get higher. Your doctor may want you to learn how to test your blood sugar level at home. Then you can see how illness, stress, or different kinds of food or medicine raise or lower your blood sugar level. Other tests may be needed to see if you have diabetes. How can you prevent high blood sugar? · Watch your weight. If you're overweight, losing just a small amount of weight may help. Reducing fat around your waist is most important. · Limit the amount of calories, sweets, and unhealthy fat you eat. Ask your doctor if a dietitian can help you. A registered dietitian can help you create meal plans that fit your lifestyle. · Get at least 30 minutes of exercise on most days of the week. Exercise helps control your blood sugar. It also helps you maintain a healthy weight. Walking is a good choice.  You also may want to do other activities, such as running, swimming, cycling, or playing tennis or team sports. · If your doctor prescribed medicines, take them exactly as prescribed. Call your doctor if you think you are having a problem with your medicine. You will get more details on the specific medicines your doctor prescribes. Follow-up care is a key part of your treatment and safety. Be sure to make and go to all appointments, and call your doctor if you are having problems. It's also a good idea to know your test results and keep a list of the medicines you take. Where can you learn more? Go to http://alfredo-susy.info/. Enter O108 in the search box to learn more about \"Learning About High Blood Sugar. \"  Current as of: April 16, 2019  Content Version: 12.2  © 8089-9504 [employer]. Care instructions adapted under license by [employer] (which disclaims liability or warranty for this information). If you have questions about a medical condition or this instruction, always ask your healthcare professional. [address] any warranty or liability for your use of this information.

## 2019-10-17 NOTE — ED TRIAGE NOTES
[facility] to [facility] because she has been having numbness and tingling in her left arm x 2 weeks. They sent her to ED because her BP was elevated at 184/92 and blood sugar was 600. She states she has not taken her prescribed blood pressure medication today and she does not always take her diabetes medications as prescribed and she does not check her blood sugar at home. She states she was also experiencing some heart burn today and took nexium and got relief.

## 2019-10-17 NOTE — ED PROVIDER NOTES
HPI patient complains of feeling numbness and tingling in her fingers and generalized sensation for several days. She went to the local urgent care facility and was told that her blood sugar was very high and she needed to come to the emergency when she came to the emergency room for further evaluation. She states a past history of insulin-dependent diabetes but she says she has not been regularly compliant with this due to other factors not controlling her schedule recently. No other complaints given at this time. Past Medical History:   Diagnosis Date    Diabetes ([facility] [address]     Hypercholesteremia     Hypertension     Nausea & vomiting     UTI (urinary tract infection)        Past Surgical History:   Procedure Laterality Date    HX APPENDECTOMY      HX HYSTERECTOMY      HX ADELA AND BSO           History reviewed. No pertinent family history.     Social History     Socioeconomic History    Marital status:      Spouse name: Not on file    Number of children: Not on file    Years of education: Not on file    Highest education level: Not on file   Occupational History    Not on file   Social Needs    Financial resource strain: Not on file    Food insecurity:     Worry: Not on file     Inability: Not on file    Transportation needs:     Medical: Not on file     Non-medical: Not on file   Tobacco Use    Smoking status: Former Smoker     Last attempt to quit: 2009     Years since quitting: 10.7    Smokeless tobacco: Never Used   Substance and Sexual Activity    Alcohol use: No    Drug use: No    Sexual activity: Not on file   Lifestyle    Physical activity:     Days per week: Not on file     Minutes per session: Not on file    Stress: Not on file   Relationships    Social connections:     Talks on phone: Not on file     Gets together: Not on file     Attends Judaism service: Not on file     Active member of club or organization: Not on file     Attends meetings of clubs or organizations: Not on file     Relationship status: Not on file    Intimate partner violence:     Fear of current or ex partner: Not on file     Emotionally abused: Not on file     Physically abused: Not on file     Forced sexual activity: Not on file   Other Topics Concern    Not on file   Social History Narrative    Not on file         ALLERGIES: Darvon [propoxyphene] and Sulfabenzamide    Review of Systems   Constitutional: Negative. HENT: Negative. Eyes: Negative. Respiratory: Negative. Cardiovascular: Negative. Gastrointestinal: Negative. Genitourinary: Negative. Musculoskeletal: Negative. Skin: Negative. Neurological: Negative. Psychiatric/Behavioral: Negative. Vitals:    10/17/19 1600 10/17/19 1615 10/17/19 1630 10/17/19 1645   BP: 139/80 155/77 150/87 150/84   Pulse:       Resp:       Temp:       SpO2: 98% 98% 99% 99%   Weight:       Height:                Physical Exam   Constitutional: She is oriented to person, place, and time. She appears well-developed. HENT:   Head: Normocephalic and atraumatic. Mouth/Throat: Oropharynx is clear and moist.   Eyes: Pupils are equal, round, and reactive to light. Conjunctivae and EOM are normal.   Neck: Normal range of motion. Neck supple. Cardiovascular: Normal rate and regular rhythm. Pulmonary/Chest: Effort normal and breath sounds normal.   Abdominal: Soft. Musculoskeletal: Normal range of motion. Neurological: She is alert and oriented to person, place, and time. Skin: Skin is warm and dry. Psychiatric: She has a normal mood and affect. Nursing note and vitals reviewed. MDM  Number of Diagnoses or Management Options  Hyperglycemia:          Procedures          Patient is feeling much better now after IV fluids and IV insulin. She understands and agrees with the disposition and follow-up plan.   Nic Cardoso MD 4:56 PM           EKG was read by myself at 2:21 PM showing normal sinus rhythm at a rate of 73 with no acute changes.

## 2019-10-18 LAB — GLUCOSE BLD STRIP.AUTO-MCNC: 466 MG/DL (ref 70–110)

## 2019-12-15 ENCOUNTER — HOSPITAL ENCOUNTER (INPATIENT)
Age: 69
LOS: 2 days | Discharge: HOME HEALTH | DRG: 064 | End: 2019-12-18
Attending: EMERGENCY MEDICINE | Admitting: INTERNAL MEDICINE
Payer: MEDICARE

## 2019-12-15 ENCOUNTER — APPOINTMENT (OUTPATIENT)
Dept: GENERAL RADIOLOGY | Age: 69
DRG: 064 | End: 2019-12-15
Attending: EMERGENCY MEDICINE
Payer: MEDICARE

## 2019-12-15 ENCOUNTER — APPOINTMENT (OUTPATIENT)
Dept: CT IMAGING | Age: 69
DRG: 064 | End: 2019-12-15
Attending: EMERGENCY MEDICINE
Payer: MEDICARE

## 2019-12-15 DIAGNOSIS — R73.9 HYPERGLYCEMIA: ICD-10-CM

## 2019-12-15 DIAGNOSIS — I63.9 ACUTE CVA (CEREBROVASCULAR ACCIDENT) (HCC): Primary | ICD-10-CM

## 2019-12-15 LAB
ALBUMIN SERPL-MCNC: 3.1 G/DL (ref 3.4–5)
ALBUMIN/GLOB SERPL: 0.7 {RATIO} (ref 0.8–1.7)
ALP SERPL-CCNC: 135 U/L (ref 45–117)
ALT SERPL-CCNC: 19 U/L (ref 13–56)
ANION GAP SERPL CALC-SCNC: 9 MMOL/L (ref 3–18)
APAP SERPL-MCNC: <2 UG/ML (ref 10–30)
APPEARANCE UR: CLEAR
APTT PPP: 28.9 SEC (ref 23–36.4)
AST SERPL-CCNC: 14 U/L (ref 10–38)
BASOPHILS # BLD: 0 K/UL (ref 0–0.1)
BASOPHILS NFR BLD: 0 % (ref 0–2)
BILIRUB SERPL-MCNC: 0.5 MG/DL (ref 0.2–1)
BILIRUB UR QL: NEGATIVE
BUN SERPL-MCNC: 32 MG/DL (ref 7–18)
BUN/CREAT SERPL: 26 (ref 12–20)
CALCIUM SERPL-MCNC: 8.6 MG/DL (ref 8.5–10.1)
CHLORIDE SERPL-SCNC: 104 MMOL/L (ref 100–111)
CK MB CFR SERPL CALC: NORMAL % (ref 0–4)
CK MB SERPL-MCNC: <1 NG/ML (ref 5–25)
CK SERPL-CCNC: 61 U/L (ref 26–192)
CO2 SERPL-SCNC: 25 MMOL/L (ref 21–32)
COLOR UR: YELLOW
CREAT SERPL-MCNC: 1.22 MG/DL (ref 0.6–1.3)
DIFFERENTIAL METHOD BLD: NORMAL
EOSINOPHIL # BLD: 0.1 K/UL (ref 0–0.4)
EOSINOPHIL NFR BLD: 2 % (ref 0–5)
ERYTHROCYTE [DISTWIDTH] IN BLOOD BY AUTOMATED COUNT: 13.8 % (ref 11.6–14.5)
GLOBULIN SER CALC-MCNC: 4.5 G/DL (ref 2–4)
GLUCOSE BLD STRIP.AUTO-MCNC: 371 MG/DL (ref 70–110)
GLUCOSE SERPL-MCNC: 382 MG/DL (ref 74–99)
GLUCOSE UR STRIP.AUTO-MCNC: >1000 MG/DL
HCT VFR BLD AUTO: 40.4 % (ref 35–45)
HGB BLD-MCNC: 13.3 G/DL (ref 12–16)
HGB UR QL STRIP: NEGATIVE
INR PPP: 1.1 (ref 0.8–1.2)
KETONES UR QL STRIP.AUTO: ABNORMAL MG/DL
LEUKOCYTE ESTERASE UR QL STRIP.AUTO: NEGATIVE
LYMPHOCYTES # BLD: 2 K/UL (ref 0.9–3.6)
LYMPHOCYTES NFR BLD: 33 % (ref 21–52)
MAGNESIUM SERPL-MCNC: 2.5 MG/DL (ref 1.6–2.6)
MCH RBC QN AUTO: 27.3 PG (ref 24–34)
MCHC RBC AUTO-ENTMCNC: 32.9 G/DL (ref 31–37)
MCV RBC AUTO: 82.8 FL (ref 74–97)
MONOCYTES # BLD: 0.3 K/UL (ref 0.05–1.2)
MONOCYTES NFR BLD: 5 % (ref 3–10)
NEUTS SEG # BLD: 3.7 K/UL (ref 1.8–8)
NEUTS SEG NFR BLD: 60 % (ref 40–73)
NITRITE UR QL STRIP.AUTO: NEGATIVE
PH UR STRIP: 5 [PH] (ref 5–8)
PLATELET # BLD AUTO: 253 K/UL (ref 135–420)
PMV BLD AUTO: 10.6 FL (ref 9.2–11.8)
POTASSIUM SERPL-SCNC: 3.5 MMOL/L (ref 3.5–5.5)
PROT SERPL-MCNC: 7.6 G/DL (ref 6.4–8.2)
PROT UR STRIP-MCNC: NEGATIVE MG/DL
PROTHROMBIN TIME: 14.1 SEC (ref 11.5–15.2)
RBC # BLD AUTO: 4.88 M/UL (ref 4.2–5.3)
SALICYLATES SERPL-MCNC: <1.7 MG/DL (ref 2.8–20)
SODIUM SERPL-SCNC: 138 MMOL/L (ref 136–145)
SP GR UR REFRACTOMETRY: 1.03 (ref 1–1.03)
TROPONIN I SERPL-MCNC: <0.02 NG/ML (ref 0–0.04)
TSH SERPL DL<=0.05 MIU/L-ACNC: 1.46 UIU/ML (ref 0.36–3.74)
UROBILINOGEN UR QL STRIP.AUTO: 1 EU/DL (ref 0.2–1)
WBC # BLD AUTO: 6.1 K/UL (ref 4.6–13.2)

## 2019-12-15 PROCEDURE — 85730 THROMBOPLASTIN TIME PARTIAL: CPT

## 2019-12-15 PROCEDURE — 74011250637 HC RX REV CODE- 250/637: Performed by: EMERGENCY MEDICINE

## 2019-12-15 PROCEDURE — 74011250636 HC RX REV CODE- 250/636: Performed by: EMERGENCY MEDICINE

## 2019-12-15 PROCEDURE — 99285 EMERGENCY DEPT VISIT HI MDM: CPT

## 2019-12-15 PROCEDURE — 80307 DRUG TEST PRSMV CHEM ANLYZR: CPT

## 2019-12-15 PROCEDURE — 84443 ASSAY THYROID STIM HORMONE: CPT

## 2019-12-15 PROCEDURE — 85610 PROTHROMBIN TIME: CPT

## 2019-12-15 PROCEDURE — 82962 GLUCOSE BLOOD TEST: CPT

## 2019-12-15 PROCEDURE — 85025 COMPLETE CBC W/AUTO DIFF WBC: CPT

## 2019-12-15 PROCEDURE — 80053 COMPREHEN METABOLIC PANEL: CPT

## 2019-12-15 PROCEDURE — 81003 URINALYSIS AUTO W/O SCOPE: CPT

## 2019-12-15 PROCEDURE — 71045 X-RAY EXAM CHEST 1 VIEW: CPT

## 2019-12-15 PROCEDURE — 82550 ASSAY OF CK (CPK): CPT

## 2019-12-15 PROCEDURE — 70450 CT HEAD/BRAIN W/O DYE: CPT

## 2019-12-15 PROCEDURE — 83735 ASSAY OF MAGNESIUM: CPT

## 2019-12-15 PROCEDURE — 93005 ELECTROCARDIOGRAM TRACING: CPT

## 2019-12-15 RX ORDER — ASPIRIN 600 MG/1
600 SUPPOSITORY RECTAL
Status: COMPLETED | OUTPATIENT
Start: 2019-12-15 | End: 2019-12-15

## 2019-12-15 RX ORDER — GUAIFENESIN 100 MG/5ML
324 LIQUID (ML) ORAL
Status: DISCONTINUED | OUTPATIENT
Start: 2019-12-15 | End: 2019-12-15

## 2019-12-15 RX ADMIN — SODIUM CHLORIDE 1000 ML: 900 INJECTION, SOLUTION INTRAVENOUS at 23:07

## 2019-12-15 RX ADMIN — ASPIRIN 600 MG: 600 SUPPOSITORY RECTAL at 23:36

## 2019-12-16 ENCOUNTER — APPOINTMENT (OUTPATIENT)
Dept: MRI IMAGING | Age: 69
DRG: 064 | End: 2019-12-16
Attending: NURSE PRACTITIONER
Payer: MEDICARE

## 2019-12-16 ENCOUNTER — APPOINTMENT (OUTPATIENT)
Age: 69
DRG: 064 | End: 2019-12-16
Attending: NURSE PRACTITIONER
Payer: MEDICARE

## 2019-12-16 ENCOUNTER — APPOINTMENT (OUTPATIENT)
Dept: CT IMAGING | Age: 69
DRG: 064 | End: 2019-12-16
Attending: EMERGENCY MEDICINE
Payer: MEDICARE

## 2019-12-16 PROBLEM — R73.9 HYPERGLYCEMIA: Status: ACTIVE | Noted: 2019-12-16

## 2019-12-16 PROBLEM — I63.9 CVA (CEREBRAL VASCULAR ACCIDENT) (HCC): Status: ACTIVE | Noted: 2019-12-16

## 2019-12-16 PROBLEM — E11.65 DIABETES MELLITUS WITH HYPERGLYCEMIA (HCC): Status: ACTIVE | Noted: 2019-12-16

## 2019-12-16 LAB
ATRIAL RATE: 76 BPM
CALCULATED P AXIS, ECG09: 51 DEGREES
CALCULATED R AXIS, ECG10: -32 DEGREES
CALCULATED T AXIS, ECG11: 53 DEGREES
DIAGNOSIS, 93000: NORMAL
EST. AVERAGE GLUCOSE BLD GHB EST-MCNC: 358 MG/DL
GLUCOSE BLD STRIP.AUTO-MCNC: 189 MG/DL (ref 70–110)
GLUCOSE BLD STRIP.AUTO-MCNC: 195 MG/DL (ref 70–110)
GLUCOSE BLD STRIP.AUTO-MCNC: 265 MG/DL (ref 70–110)
GLUCOSE BLD STRIP.AUTO-MCNC: 266 MG/DL (ref 70–110)
GLUCOSE BLD STRIP.AUTO-MCNC: 269 MG/DL (ref 70–110)
HBA1C MFR BLD: 14.1 % (ref 4.2–5.6)
P-R INTERVAL, ECG05: 174 MS
Q-T INTERVAL, ECG07: 416 MS
QRS DURATION, ECG06: 82 MS
QTC CALCULATION (BEZET), ECG08: 468 MS
VENTRICULAR RATE, ECG03: 76 BPM

## 2019-12-16 PROCEDURE — 70547 MR ANGIOGRAPHY NECK W/O DYE: CPT

## 2019-12-16 PROCEDURE — 83036 HEMOGLOBIN GLYCOSYLATED A1C: CPT

## 2019-12-16 PROCEDURE — 74011636637 HC RX REV CODE- 636/637: Performed by: INTERNAL MEDICINE

## 2019-12-16 PROCEDURE — 92523 SPEECH SOUND LANG COMPREHEN: CPT

## 2019-12-16 PROCEDURE — 74011250636 HC RX REV CODE- 250/636: Performed by: NURSE PRACTITIONER

## 2019-12-16 PROCEDURE — 70551 MRI BRAIN STEM W/O DYE: CPT

## 2019-12-16 PROCEDURE — 82962 GLUCOSE BLOOD TEST: CPT

## 2019-12-16 PROCEDURE — 74011250637 HC RX REV CODE- 250/637: Performed by: NURSE PRACTITIONER

## 2019-12-16 PROCEDURE — 70450 CT HEAD/BRAIN W/O DYE: CPT

## 2019-12-16 PROCEDURE — 65660000000 HC RM CCU STEPDOWN

## 2019-12-16 PROCEDURE — 70544 MR ANGIOGRAPHY HEAD W/O DYE: CPT

## 2019-12-16 PROCEDURE — 36415 COLL VENOUS BLD VENIPUNCTURE: CPT

## 2019-12-16 PROCEDURE — 74011636637 HC RX REV CODE- 636/637: Performed by: NURSE PRACTITIONER

## 2019-12-16 PROCEDURE — 92610 EVALUATE SWALLOWING FUNCTION: CPT

## 2019-12-16 RX ORDER — INSULIN GLARGINE 100 [IU]/ML
10 INJECTION, SOLUTION SUBCUTANEOUS
Status: DISCONTINUED | OUTPATIENT
Start: 2019-12-16 | End: 2019-12-17

## 2019-12-16 RX ORDER — LABETALOL HCL 20 MG/4 ML
5 SYRINGE (ML) INTRAVENOUS
Status: DISCONTINUED | OUTPATIENT
Start: 2019-12-16 | End: 2019-12-18 | Stop reason: HOSPADM

## 2019-12-16 RX ORDER — PANTOPRAZOLE SODIUM 40 MG/1
40 TABLET, DELAYED RELEASE ORAL
Status: DISCONTINUED | OUTPATIENT
Start: 2019-12-17 | End: 2019-12-18 | Stop reason: HOSPADM

## 2019-12-16 RX ORDER — ATORVASTATIN CALCIUM 40 MG/1
80 TABLET, FILM COATED ORAL
Status: DISCONTINUED | OUTPATIENT
Start: 2019-12-16 | End: 2019-12-18 | Stop reason: HOSPADM

## 2019-12-16 RX ORDER — SODIUM CHLORIDE 9 MG/ML
75 INJECTION, SOLUTION INTRAVENOUS CONTINUOUS
Status: DISPENSED | OUTPATIENT
Start: 2019-12-16 | End: 2019-12-17

## 2019-12-16 RX ORDER — AMLODIPINE BESYLATE 5 MG/1
5 TABLET ORAL DAILY
Status: ON HOLD | COMMUNITY
End: 2019-12-18 | Stop reason: SDUPTHER

## 2019-12-16 RX ORDER — INSULIN GLARGINE 100 [IU]/ML
20 INJECTION, SOLUTION SUBCUTANEOUS
Status: DISCONTINUED | OUTPATIENT
Start: 2019-12-16 | End: 2019-12-16

## 2019-12-16 RX ORDER — DEXTROSE 50 % IN WATER (D50W) INTRAVENOUS SYRINGE
25-50 AS NEEDED
Status: DISCONTINUED | OUTPATIENT
Start: 2019-12-16 | End: 2019-12-16

## 2019-12-16 RX ORDER — CHLORTHALIDONE 25 MG/1
12.5 TABLET ORAL DAILY
COMMUNITY
End: 2019-12-18

## 2019-12-16 RX ORDER — GLIPIZIDE 5 MG/1
5 TABLET ORAL DAILY
COMMUNITY
End: 2019-12-18

## 2019-12-16 RX ORDER — ASPIRIN 81 MG/1
81 TABLET ORAL DAILY
Status: DISCONTINUED | OUTPATIENT
Start: 2019-12-17 | End: 2019-12-18 | Stop reason: HOSPADM

## 2019-12-16 RX ORDER — LISINOPRIL 10 MG/1
10 TABLET ORAL DAILY
Status: DISCONTINUED | OUTPATIENT
Start: 2019-12-17 | End: 2019-12-18 | Stop reason: HOSPADM

## 2019-12-16 RX ORDER — INSULIN LISPRO 100 [IU]/ML
INJECTION, SOLUTION INTRAVENOUS; SUBCUTANEOUS
Status: DISCONTINUED | OUTPATIENT
Start: 2019-12-16 | End: 2019-12-18 | Stop reason: HOSPADM

## 2019-12-16 RX ORDER — MAGNESIUM SULFATE 100 %
4 CRYSTALS MISCELLANEOUS AS NEEDED
Status: DISCONTINUED | OUTPATIENT
Start: 2019-12-16 | End: 2019-12-18 | Stop reason: HOSPADM

## 2019-12-16 RX ORDER — DEXTROSE MONOHYDRATE 100 MG/ML
125-250 INJECTION, SOLUTION INTRAVENOUS AS NEEDED
Status: DISCONTINUED | OUTPATIENT
Start: 2019-12-16 | End: 2019-12-18 | Stop reason: HOSPADM

## 2019-12-16 RX ORDER — AMLODIPINE BESYLATE 5 MG/1
5 TABLET ORAL DAILY
Status: DISCONTINUED | OUTPATIENT
Start: 2019-12-17 | End: 2019-12-18 | Stop reason: HOSPADM

## 2019-12-16 RX ORDER — ACETAMINOPHEN 325 MG/1
650 TABLET ORAL
Status: DISCONTINUED | OUTPATIENT
Start: 2019-12-16 | End: 2019-12-18 | Stop reason: HOSPADM

## 2019-12-16 RX ORDER — ONDANSETRON 2 MG/ML
4 INJECTION INTRAMUSCULAR; INTRAVENOUS
Status: DISCONTINUED | OUTPATIENT
Start: 2019-12-16 | End: 2019-12-18 | Stop reason: HOSPADM

## 2019-12-16 RX ORDER — HEPARIN SODIUM 5000 [USP'U]/ML
5000 INJECTION, SOLUTION INTRAVENOUS; SUBCUTANEOUS EVERY 8 HOURS
Status: DISCONTINUED | OUTPATIENT
Start: 2019-12-16 | End: 2019-12-18 | Stop reason: HOSPADM

## 2019-12-16 RX ADMIN — HEPARIN SODIUM 5000 UNITS: 5000 INJECTION INTRAVENOUS; SUBCUTANEOUS at 22:49

## 2019-12-16 RX ADMIN — SODIUM CHLORIDE 75 ML/HR: 900 INJECTION, SOLUTION INTRAVENOUS at 17:52

## 2019-12-16 RX ADMIN — INSULIN LISPRO 6 UNITS: 100 INJECTION, SOLUTION INTRAVENOUS; SUBCUTANEOUS at 12:42

## 2019-12-16 RX ADMIN — ATORVASTATIN CALCIUM 80 MG: 40 TABLET, FILM COATED ORAL at 22:49

## 2019-12-16 RX ADMIN — INSULIN LISPRO 2 UNITS: 100 INJECTION, SOLUTION INTRAVENOUS; SUBCUTANEOUS at 17:53

## 2019-12-16 RX ADMIN — HEPARIN SODIUM 5000 UNITS: 5000 INJECTION INTRAVENOUS; SUBCUTANEOUS at 17:53

## 2019-12-16 RX ADMIN — INSULIN LISPRO 2 UNITS: 100 INJECTION, SOLUTION INTRAVENOUS; SUBCUTANEOUS at 22:50

## 2019-12-16 RX ADMIN — INSULIN GLARGINE 10 UNITS: 100 INJECTION, SOLUTION SUBCUTANEOUS at 22:50

## 2019-12-16 NOTE — ED TRIAGE NOTES
Daughter provides details regarding patient's condition. Patient noted to be alert and oriented to self only. Daughter states that patient has had cough x 3 weeks. Advises that patient has appeared confused x 3 days. States blood sugar elevation since yesterday. Advises that patient was evaluated by [facility] yesterday for hyperglycemia. States gait instability noted yesterday involving right lower extremity. States patient demonstrating right upper extremity weakness and difficulty holding items since yesterday. Patient states that she is not taking diabetic medications on a consistent basis. Denies any fall.

## 2019-12-16 NOTE — ED NOTES
Pt remains easily rouseable to voice. Noted increase in difficulty with clear speech; per daughter speech clarity has waxed and waned but at this time noted pt speech increasingly unintelligible with pt able to identify picture scenarios with fragmented identification of situation with inability to identify any of the listed objects. No worsening change in right arm with regard to drift but drift remains. Pt remains NPO. Dr Latricia Aguirre notified of change in NIH of 8 points; VO/RBV to repeat head CT. Questionable/slight weakness to right hand as compared to left; pt follows commands per request.  Pt able to perform finger-to-nose test but is unable to complete task as evidenced by touching her nose with right hand index finger; finger remains approx 6 inches away from her nose but is unable to touch it. No distress noted; VS WNL.

## 2019-12-16 NOTE — ED NOTES
Pt remains easily rouseable; NIH score has improved with deficits remaining with regard to pt unable to correctly state last name clearly, or current month/year/. Pt unable to clearly identify objects; states correctly chair and glove but identifies all other objects as chairs. Able to identify picture that child is standing on the chair but remains unable to identify clearly other events. Current NIH 6. Fall Risk armband placed on pt.

## 2019-12-16 NOTE — H&P
History and Physical      PCP: Patient is being followed by [facility]    Chief complaint: Intermittent confusion, right hand numbness and slurring of speech since Thursday      Subjective:     Hector Luna is a 78 y.o.  female who presents with intermittent confusion, right hand numbness and slurring of speech since Thursday. Patient is getting better slowly currently. No right hand numbness currently. Speech is little bit better. Patient's mother is at bedside. Patient knows where she is, date of birth, and her home address. No headaches or dizziness or visual disturbances. No runny nose or watery eyes. No chest pain or shortness of breath or cough. No nausea vomiting or abdominal pain. No tingling or numbness currently. No recent change in medication. Denies smoking cigarettes or drinking any alcohol. Patient admits forgetful about taking her medication sometimes especially injection Lantus. I got the medication bottles and updated the admission MAR. Patient is allergic to Darvon per chart. She lives by herself. Past medical history of diabetes, dyslipidemia and hypertension. Past Medical History:   Diagnosis Date    Diabetes ([address]     Hypercholesteremia     Hypertension     Nausea & vomiting     UTI (urinary tract infection)       Past Surgical History:   Procedure Laterality Date    HX APPENDECTOMY      HX HYSTERECTOMY      HX ADELA AND BSO       History reviewed. No pertinent family history. Social History     Tobacco Use    Smoking status: Former Smoker     Last attempt to quit: 2009     Years since quitting: 10.9    Smokeless tobacco: Never Used   Substance Use Topics    Alcohol use: Yes     Comment: occasional       Prior to Admission medications    Medication Sig Start Date End Date Taking? Authorizing Provider   amLODIPine (NORVASC) 5 mg tablet Take 5 mg by mouth daily.    Yes Provider, Historical   chlorthalidone (HYGROTEN) 25 mg tablet Take 12.5 mg by mouth daily. Yes Provider, Historical   glipiZIDE (GLUCOTROL) 5 mg tablet Take 5 mg by mouth daily. Yes Provider, Historical   lisinopril (PRINIVIL, ZESTRIL) 10 mg tablet Take 10 mg by mouth. Provider, Historical   atorvastatin (LIPITOR) 20 mg tablet Take 20 mg by mouth daily. Provider, Historical   Omeprazole delayed release (PRILOSEC D/R) 20 mg tablet Take 20 mg by mouth daily. Provider, Historical   insulin glargine (LANTUS) 100 unit/mL injection 10 Units by SubCUTAneous route nightly. Provider, Historical     Allergies   Allergen Reactions    Darvon [Propoxyphene] Nausea and Vomiting    Sulfabenzamide Hives and Rash        Review of Systems:  A comprehensive review of systems was negative except for that written in the History of Present Illness. Objective: Intake and Output:    No intake/output data recorded. No intake/output data recorded. Physical Exam:     /76 (BP 1 Location: Right arm, BP Patient Position: At rest)   Pulse 75   Temp 99 °F (37.2 °C)   Resp 18   Ht 5' 4\" (1.626 m)   Wt 72.6 kg (160 lb)   SpO2 97%   BMI 27.46 kg/m²     General appearance -awake, follows commands appropriately, slow responder, oriented x3 currently. Not in acute distress. Head -atraumatic, normocephalic. Eyes - sclera anicteric, no pallor  Nose - no obvious nasal discharge.    Neck - supple, no JVD, trachea is midline  Chest - Normal air entry noted in bases, no wheezes  Heart - S1 and S2 normal  Abdomen - soft, nontender, nondistended, Bowel sounds present  Neurological -awake, oriented, slurring of speech, no focal findings noted, sensation to touch normal in both upper extremities and lower extremities  Musculoskeletal - no joint tenderness or swelling of knees bilaterally  Extremities - no pedal edema noted    Data Review:   Recent Results (from the past 24 hour(s))   GLUCOSE, POC    Collection Time: 12/15/19  9:49 PM   Result Value Ref Range    Glucose (POC) 371 (H) 70 - 110 mg/dL   CBC WITH AUTOMATED DIFF    Collection Time: 12/15/19  9:55 PM   Result Value Ref Range    WBC 6.1 4.6 - 13.2 K/uL    RBC 4.88 4.20 - 5.30 M/uL    HGB 13.3 12.0 - 16.0 g/dL    HCT 40.4 35.0 - 45.0 %    MCV 82.8 74.0 - 97.0 FL    MCH 27.3 24.0 - 34.0 PG    MCHC 32.9 31.0 - 37.0 g/dL    RDW 13.8 11.6 - 14.5 %    PLATELET 079 203 - 871 K/uL    MPV 10.6 9.2 - 11.8 FL    NEUTROPHILS 60 40 - 73 %    LYMPHOCYTES 33 21 - 52 %    MONOCYTES 5 3 - 10 %    EOSINOPHILS 2 0 - 5 %    BASOPHILS 0 0 - 2 %    ABS. NEUTROPHILS 3.7 1.8 - 8.0 K/UL    ABS. LYMPHOCYTES 2.0 0.9 - 3.6 K/UL    ABS. MONOCYTES 0.3 0.05 - 1.2 K/UL    ABS. EOSINOPHILS 0.1 0.0 - 0.4 K/UL    ABS. BASOPHILS 0.0 0.0 - 0.1 K/UL    DF AUTOMATED     METABOLIC PANEL, COMPREHENSIVE    Collection Time: 12/15/19  9:55 PM   Result Value Ref Range    Sodium 138 136 - 145 mmol/L    Potassium 3.5 3.5 - 5.5 mmol/L    Chloride 104 100 - 111 mmol/L    CO2 25 21 - 32 mmol/L    Anion gap 9 3.0 - 18 mmol/L    Glucose 382 (H) 74 - 99 mg/dL    BUN 32 (H) 7.0 - 18 MG/DL    Creatinine 1.22 0.6 - 1.3 MG/DL    BUN/Creatinine ratio 26 (H) 12 - 20      GFR est AA 53 (L) >60 ml/min/1.73m2    GFR est non-AA 44 (L) >60 ml/min/1.73m2    Calcium 8.6 8.5 - 10.1 MG/DL    Bilirubin, total 0.5 0.2 - 1.0 MG/DL    ALT (SGPT) 19 13 - 56 U/L    AST (SGOT) 14 10 - 38 U/L    Alk.  phosphatase 135 (H) 45 - 117 U/L    Protein, total 7.6 6.4 - 8.2 g/dL    Albumin 3.1 (L) 3.4 - 5.0 g/dL    Globulin 4.5 (H) 2.0 - 4.0 g/dL    A-G Ratio 0.7 (L) 0.8 - 1.7     MAGNESIUM    Collection Time: 12/15/19  9:55 PM   Result Value Ref Range    Magnesium 2.5 1.6 - 2.6 mg/dL   PROTHROMBIN TIME + INR    Collection Time: 12/15/19  9:55 PM   Result Value Ref Range    Prothrombin time 14.1 11.5 - 15.2 sec    INR 1.1 0.8 - 1.2     PTT    Collection Time: 12/15/19  9:55 PM   Result Value Ref Range    aPTT 28.9 23.0 - 36.4 SEC   CARDIAC PANEL,(CK, CKMB & TROPONIN)    Collection Time: 12/15/19  9:55 PM Result Value Ref Range    CK 61 26 - 192 U/L    CK - MB <1.0 <3.6 ng/ml    CK-MB Index  0.0 - 4.0 %     CALCULATION NOT PERFORMED WHEN RESULT IS BELOW LINEAR LIMIT    Troponin-I, QT <0.02 0.0 - 0.045 NG/ML   TSH 3RD GENERATION    Collection Time: 12/15/19  9:55 PM   Result Value Ref Range    TSH 1.46 0.36 - 3.74 uIU/mL   ACETAMINOPHEN    Collection Time: 12/15/19  9:55 PM   Result Value Ref Range    Acetaminophen level <2 (L) 10.0 - 04.4 ug/mL   SALICYLATE    Collection Time: 12/15/19  9:55 PM   Result Value Ref Range    Salicylate level <8.0 (L) 2.8 - 20.0 MG/DL   EKG, 12 LEAD, INITIAL    Collection Time: 12/15/19 10:05 PM   Result Value Ref Range    Ventricular Rate 76 BPM    Atrial Rate 76 BPM    P-R Interval 174 ms    QRS Duration 82 ms    Q-T Interval 416 ms    QTC Calculation (Bezet) 468 ms    Calculated P Axis 51 degrees    Calculated R Axis -32 degrees    Calculated T Axis 53 degrees    Diagnosis       Sinus rhythm with fusion complexes  Left axis deviation  Moderate voltage criteria for LVH, may be normal variant  Abnormal ECG  When compared with ECG of 17-OCT-2019 14:21,  fusion complexes are now present  Confirmed by Aquiles Aden ([id number]) on 12/16/2019 7:46:17 AM     URINALYSIS W/ RFLX MICROSCOPIC    Collection Time: 12/15/19 10:40 PM   Result Value Ref Range    Color YELLOW      Appearance CLEAR      Specific gravity 1.026 1.005 - 1.030      pH (UA) 5.0 5.0 - 8.0      Protein NEGATIVE  NEG mg/dL    Glucose >1,000 (A) NEG mg/dL    Ketone TRACE (A) NEG mg/dL    Bilirubin NEGATIVE  NEG      Blood NEGATIVE  NEG      Urobilinogen 1.0 0.2 - 1.0 EU/dL    Nitrites NEGATIVE  NEG      Leukocyte Esterase NEGATIVE  NEG     GLUCOSE, POC    Collection Time: 12/16/19  1:00 AM   Result Value Ref Range    Glucose (POC) 266 (H) 70 - 110 mg/dL   GLUCOSE, POC    Collection Time: 12/16/19  8:22 AM   Result Value Ref Range    Glucose (POC) 269 (H) 70 - 110 mg/dL   HEMOGLOBIN A1C WITH EAG    Collection Time: 12/16/19 10:14 AM   Result Value Ref Range    Hemoglobin A1c 14.1 (H) 4.2 - 5.6 %    Est. average glucose 358 mg/dL   GLUCOSE, POC    Collection Time: 12/16/19 11:48 AM   Result Value Ref Range    Glucose (POC) 265 (H) 70 - 110 mg/dL     CXR Results  (Last 48 hours)               12/15/19 2254  XR CHEST PORT Final result    Impression:   IMPRESSION:       1. No acute cardiopulmonary process. Narrative:  HISTORY: Chest pain shortness of breath. EXAM: Chest.       TECHNIQUE: Single view AP portable chest.        COMPARISON: No prior studies for comparison. FINDINGS: There is no pneumothorax, pneumonia or pleural effusions. Heart and   mediastinal structures are unremarkable. . Visualized bony thorax and soft   tissues are within normal limits. CT Results  (Last 48 hours)               12/16/19 0435  CT HEAD WO CONT Final result    Impression:  IMPRESSION:   Multifocal age indeterminant but potentially acute/subacute infarcts in the   right basal ganglia, left corona radiata to centrum semiovale. Small   hyperdensities around the right basal ganglia infarct may represent petechial   hemorrhage or calcification. Overall no significant interval change when   compared to 12/15/2019 although these are new since 2/3/2018. Narrative:  CT of the head without contrast       HISTORY: Acute CVA and hyperglycemia. Presenting with confusion and slurred   speech. COMPARISON: CT 12/15/2019 and 2/3/2018       All CT scans at this facility are performed using dose optimization technique as   appropriate to a performed exam, to include automated exposure control,   adjustment of the MA and/or kV according to patient size (including appropriate   matching for site-specific examinations) or use of  iterative reconstruction   technique. FINDINGS: No significant interval change compared to 12/15/2019 however the   multifocal parenchymal hypodensities are new since 2/3/2018.  Age indeterminate   but suspected acute/subacute lacunar infarct in the right basal ganglia with a   small amount of hyperdensity at the periphery. Multifocal age indeterminant but   potentially acute/subacute infarcts in the left corona radiata to centrum   semiovale. No acute hemorrhage or mass lesion. Visualized paranasal sinuses and   mastoid air cells are clear. Calvarium is intact. 12/15/19 2228  CT HEAD WO CONT Final result    Impression:  IMPRESSION:        There are 2 age indeterminant hypodense small subcortical infarcts high left   frontal lobe and a single age indeterminant right basal ganglia. No hemorrhage   or mass effect, no shift or hydrocephalus. Several chronic left hemispheric infarcts as detailed above. Narrative:  CT OF THE BRAIN       CPT CODE: 91006       COMPARISON: None. INDICATIONS: Right-sided headache. TECHNIQUE: Axial sections through the brain at 5 mm collimation without IV   contrast are obtained. All CT scans at this facility are performed using dose optimization technique as   appropriate to the performed exam, to include automated exposure control,   adjustment of the mA and/or kV according to patient's size (Including   appropriate matching for site-specific examinations), or use of iterative   reconstruction technique. FINDINGS:         The ventricles are of normal size and configuration without midline shift. .   There is are chronic appearing infarcts in the deep left frontal lobe adjacent   to the left lateral ventricle and left posterior lateral ventricle. There is an age-indeterminate infarct in the right basal ganglia. There are 2 age indeterminant subcortical low density infarcts high left frontal   lobe, axial 17, 18. No mass, no hemorrhage, no shift. The visible portions of the paranasal sinuses are clear. .                    Assessment:     Active Problems:    Hyperglycemia (12/16/2019) CVA (cerebral vascular accident) ([facility] [address] (12/16/2019)      Diabetes mellitus with hyperglycemia ([facility] [address] (12/16/2019)       1. Intermittent confusion, due to #3, improving  2. Slurring of speech,  3. Multifocal acute/subacute infarcts involving right basal ganglia, left coronary radiata. 4.  Diabetes mellitus with hyperglycemia  5. Hypertension  6. Dyslipidemia  7. Medical noncompliance    Plan:     Patient will be admitted to telemetry floor  Stroke order set has been ordered an MRI pending  CT scan of the head report reviewed  Admission MAR has been updated  Continue home medications  Neurology to see this patient  PT and OT to follow this patient  Patient wishes to be full code    Disclaimer: Sections of this note are dictated using utilizing voice recognition software. Minor typographical errors may be present. If questions arise, please do not hesitate to contact me or call our department.

## 2019-12-16 NOTE — DIABETES MGMT
NUTRITIONAL ASSESSMENT GLYCEMIC CONTROL/ PLAN OF CARE     Nancy Chi           78 y.o.           12/15/2019                 1. Acute CVA (cerebrovascular accident) ([address]    2. Hyperglycemia       INTERVENTIONS/PLAN:   Monitor need for basal insulin coverage    ASSESSMENT:   Pt is a 78year old female with a past medical history significant for hypertension, hyperlipidemia, and diabetes who presented with confusion and slurred speech. Correctional Lispro insulin has been ordered. Blood glucose currently elevated. Noted pt seen by SLP this morning, diet has been ordered. Will monitor po intake. Will follow up for diabetes education and assessment of home management as pt becomes less confused. Current Hemoglobin A1c has been ordered.     Diabetes Management:   Recent blood glucose:    12/15/2019 21:49 12/16/2019 01:00 12/16/2019 08:22   371 (H) 266 (H) 269 (H)   Within target range (non-ICU: <140; ICU<180): [] Yes   [x]  No    Current Insulin regimen:  Correctional Lispro insulin 4 times daily ACHS  Home medication/insulin regimen: Per PTA meds, will attempt to verify when pt less confused  Lantus insulin 30 units nightly   Metformin   Novolog 70/30 insulin 7 units TID   HbA1c: current A1c pending  Adequate glycemic control PTA:  [] Yes  [] No     SUBJECTIVE/OBJECTIVE:   Diet: Diabetic consistent carbohydrate 1800 Kcal, 2 gram NA, no concentrated sweets     Medications: [x]  Reviewed     Most Recent POC Glucose:   Recent Labs     12/15/19  2155   *      Labs:   No results found for: HBA1C, HGBE8, VCB3FIRC  Lab Results   Component Value Date/Time    Sodium 138 12/15/2019 09:55 PM    Potassium 3.5 12/15/2019 09:55 PM    Chloride 104 12/15/2019 09:55 PM    CO2 25 12/15/2019 09:55 PM    Anion gap 9 12/15/2019 09:55 PM    Glucose 382 (H) 12/15/2019 09:55 PM    BUN 32 (H) 12/15/2019 09:55 PM    Creatinine 1.22 12/15/2019 09:55 PM    Calcium 8.6 12/15/2019 09:55 PM    Magnesium 2.5 12/15/2019 09:55 PM    Albumin 3.1 (L) 12/15/2019 09:55 PM     Anthropometrics:BMI (calculated): 27.5  Wt Readings from Last 1 Encounters:   12/15/19 72.6 kg (160 lb)      Ht Readings from Last 1 Encounters:   12/15/19 5' 4\" (1.626 m)     Estimated Nutrition Needs: 3604-5126 Kcal/day, 58-73 grams protein/day    Based on:   [x]  Actual BW    []IBW    []Adjusted BW      Nutrition Diagnoses:    Altered nutrition related lab value related to diabetes as evidenced by elevated blood glucose of 382 mg/dL   Nutrition Interventions: diabetic diet, coordination of care   Goal: Blood glucose will be within target range of  mg/dL by 12/19/19     Nutrition Monitoring and Evaluation    []     Monitor po intake on meal rounds  [x]     Continue inpatient monitoring and intervention  []     Other:    Viki Crowell, [address], [address] [place] [address]  Ext [phone]

## 2019-12-16 NOTE — ED NOTES
Pt resting comfortably in bed 7 (previous location). Pt noted to have gotten out of bed to void; no falls noted but pt able to walk with minimal assistance to/from BR without difficulty. Pt clearly more awake/alert/conversant. Pt is able to correctly identify most body parts that are touched although states her cheeks as her \"jaws\". Denies numbness/weakness to right arm. Remains confused to month but states year correctly. Slight slurring of speech remains but no difficulty understanding speech at this time. Pt oriented to month/upcoming holiday, and current reason for being in ED. No distress noted at this time.

## 2019-12-16 NOTE — ED PROVIDER NOTES
Swapna Wilson is a 78 y.o. female with a past medical history of hypertension, hyperlipidemia, and diabetes coming in with daughter for confusion and slurred speech. Patient is only oriented to person, not place or time. Daughter is here with her and states that normally she is completely alert and oriented and takes care of herself and lives alone. Daughter states that about 2 to 3 weeks ago she started having a dry cough, runny nose, nasal congestion, and sore throat consistent with a viral URI. She states for the last 3 to 4 days her mom has sounded slurred on the phone. She states that [facility] and noticed some right upper extremity weakness or uncoordination. She states that she was diagnosed with high blood [facility] yesterday at [facility] and was discharged home after she was treated. Patient's daughter states that she found her at home today and she had obvious weakness of her right upper extremity and was dropping things. Daughter states that she is been very confused. Daughter denies any new medications other than TheraFlu and Mucinex which she has had a couple times. Daughter denies any falls or trauma. Denies any fevers. History otherwise limited due to patient confusion. Past Medical History:   Diagnosis Date    Diabetes ([address]     Hypercholesteremia     Hypertension     Nausea & vomiting     UTI (urinary tract infection)        Past Surgical History:   Procedure Laterality Date    HX APPENDECTOMY      HX HYSTERECTOMY      HX ADELA AND BSO           History reviewed. No pertinent family history.     Social History     Socioeconomic History    Marital status:      Spouse name: Not on file    Number of children: Not on file    Years of education: Not on file    Highest education level: Not on file   Occupational History    Not on file   Social Needs    Financial resource strain: Not on file    Food insecurity:     Worry: Not on file Inability: Not on file    Transportation needs:     Medical: Not on file     Non-medical: Not on file   Tobacco Use    Smoking status: Former Smoker     Last attempt to quit: 2009     Years since quitting: 10.9    Smokeless tobacco: Never Used   Substance and Sexual Activity    Alcohol use: Yes     Comment: occasional    Drug use: No    Sexual activity: Not on file   Lifestyle    Physical activity:     Days per week: Not on file     Minutes per session: Not on file    Stress: Not on file   Relationships    Social connections:     Talks on phone: Not on file     Gets together: Not on file     Attends Anabaptism service: Not on file     Active member of club or organization: Not on file     Attends meetings of clubs or organizations: Not on file     Relationship status: Not on file    Intimate partner violence:     Fear of current or ex partner: Not on file     Emotionally abused: Not on file     Physically abused: Not on file     Forced sexual activity: Not on file   Other Topics Concern    Not on file   Social History Narrative    Not on file         ALLERGIES: Darvon [propoxyphene] and Sulfabenzamide    Review of Systems   Unable to perform ROS: Mental status change       Vitals:    12/15/19 2149 12/15/19 2150 12/15/19 2242 12/15/19 2248   BP: 114/58  146/65    Pulse:  81  78   Resp:  19  17   Temp:       SpO2:  97%  98%   Weight:       Height:                Physical Exam  Vitals signs reviewed. Constitutional:       General: She is not in acute distress. Appearance: Normal appearance. She is well-developed. HENT:      Head: Normocephalic and atraumatic. Mouth/Throat:      Mouth: Mucous membranes are dry. Eyes:      Extraocular Movements: Extraocular movements intact. Conjunctiva/sclera: Conjunctivae normal.      Pupils: Pupils are equal, round, and reactive to light. Neck:      Musculoskeletal: Normal range of motion and neck supple. No neck rigidity. Thyroid: No thyromegaly. Vascular: No JVD. Trachea: Trachea normal.   Cardiovascular:      Rate and Rhythm: Normal rate and regular rhythm. Heart sounds: S1 normal and S2 normal. No murmur. No friction rub. No gallop. Pulmonary:      Effort: Pulmonary effort is normal. No accessory muscle usage or respiratory distress. Breath sounds: Normal breath sounds. Abdominal:      General: There is no distension. Palpations: Abdomen is soft. Abdomen is not rigid. Tenderness: There is no tenderness. Musculoskeletal: Normal range of motion. General: No tenderness. Skin:     General: Skin is warm. Findings: No rash. Neurological:      Mental Status: She is alert. Comments: Patient is awake and alert. She follows commands. She is only oriented to person. There is some mild dysarthria. Minimal right facial droop. She has right upper extremity weakness and ataxia. MDM  Number of Diagnoses or Management Options  Acute CVA (cerebrovascular accident) [facility]): Hyperglycemia:   Diagnosis management comments: Karlene Chandler is a 78 y.o. female coming in for right upper extremity weakness, slurred speech, and confusion. The sounds as if it has been progressive over the last few days now. Patient far outside the window of TPA. Code has not called. We will get work-up for CVA, as well as other metabolic/infectious etiologies of patient's symptoms. No neck rigidity or concern for meningitis/encephalitis. Patient is afebrile. Vitals reassuring. CT shows 2 age-indeterminate left frontal infarcts and a right basal ganglia infarct. No evidence of acute bleed. Symptoms now at least 3to 11days old. Well outside the window for TPA. Will provide aspirin and admit to stroke neuro floor for further work-up.          Procedures    Vitals:  Patient Vitals for the past 12 hrs:   Temp Pulse Resp BP SpO2   12/15/19 2248  78 17  98 %   12/15/19 2242    146/65    12/15/19 2150  81 19  97 % 12/15/19 2149    114/58    12/15/19 2147    114/58    12/15/19 2133 98.8 °F (37.1 °C) 82 20 (!) 86/58 97 %       Medications ordered:   Medications   sodium chloride 0.9 % bolus infusion 1,000 mL (1,000 mL IntraVENous New Bag 12/15/19 2307)   aspirin (ASA) suppository 600 mg (600 mg Rectal Given 12/15/19 2336)         Lab findings:  Recent Results (from the past 12 hour(s))   GLUCOSE, POC    Collection Time: 12/15/19  9:49 PM   Result Value Ref Range    Glucose (POC) 371 (H) 70 - 110 mg/dL   CBC WITH AUTOMATED DIFF    Collection Time: 12/15/19  9:55 PM   Result Value Ref Range    WBC 6.1 4.6 - 13.2 K/uL    RBC 4.88 4.20 - 5.30 M/uL    HGB 13.3 12.0 - 16.0 g/dL    HCT 40.4 35.0 - 45.0 %    MCV 82.8 74.0 - 97.0 FL    MCH 27.3 24.0 - 34.0 PG    MCHC 32.9 31.0 - 37.0 g/dL    RDW 13.8 11.6 - 14.5 %    PLATELET 444 045 - 097 K/uL    MPV 10.6 9.2 - 11.8 FL    NEUTROPHILS 60 40 - 73 %    LYMPHOCYTES 33 21 - 52 %    MONOCYTES 5 3 - 10 %    EOSINOPHILS 2 0 - 5 %    BASOPHILS 0 0 - 2 %    ABS. NEUTROPHILS 3.7 1.8 - 8.0 K/UL    ABS. LYMPHOCYTES 2.0 0.9 - 3.6 K/UL    ABS. MONOCYTES 0.3 0.05 - 1.2 K/UL    ABS. EOSINOPHILS 0.1 0.0 - 0.4 K/UL    ABS. BASOPHILS 0.0 0.0 - 0.1 K/UL    DF AUTOMATED     METABOLIC PANEL, COMPREHENSIVE    Collection Time: 12/15/19  9:55 PM   Result Value Ref Range    Sodium 138 136 - 145 mmol/L    Potassium 3.5 3.5 - 5.5 mmol/L    Chloride 104 100 - 111 mmol/L    CO2 25 21 - 32 mmol/L    Anion gap 9 3.0 - 18 mmol/L    Glucose 382 (H) 74 - 99 mg/dL    BUN 32 (H) 7.0 - 18 MG/DL    Creatinine 1.22 0.6 - 1.3 MG/DL    BUN/Creatinine ratio 26 (H) 12 - 20      GFR est AA 53 (L) >60 ml/min/1.73m2    GFR est non-AA 44 (L) >60 ml/min/1.73m2    Calcium 8.6 8.5 - 10.1 MG/DL    Bilirubin, total 0.5 0.2 - 1.0 MG/DL    ALT (SGPT) 19 13 - 56 U/L    AST (SGOT) 14 10 - 38 U/L    Alk.  phosphatase 135 (H) 45 - 117 U/L    Protein, total 7.6 6.4 - 8.2 g/dL    Albumin 3.1 (L) 3.4 - 5.0 g/dL    Globulin 4.5 (H) 2.0 - 4.0 g/dL    A-G Ratio 0.7 (L) 0.8 - 1.7     MAGNESIUM    Collection Time: 12/15/19  9:55 PM   Result Value Ref Range    Magnesium 2.5 1.6 - 2.6 mg/dL   PROTHROMBIN TIME + INR    Collection Time: 12/15/19  9:55 PM   Result Value Ref Range    Prothrombin time 14.1 11.5 - 15.2 sec    INR 1.1 0.8 - 1.2     PTT    Collection Time: 12/15/19  9:55 PM   Result Value Ref Range    aPTT 28.9 23.0 - 36.4 SEC   CARDIAC PANEL,(CK, CKMB & TROPONIN)    Collection Time: 12/15/19  9:55 PM   Result Value Ref Range    CK 61 26 - 192 U/L    CK - MB <1.0 <3.6 ng/ml    CK-MB Index  0.0 - 4.0 %     CALCULATION NOT PERFORMED WHEN RESULT IS BELOW LINEAR LIMIT    Troponin-I, QT <0.02 0.0 - 0.045 NG/ML   TSH 3RD GENERATION    Collection Time: 12/15/19  9:55 PM   Result Value Ref Range    TSH 1.46 0.36 - 3.74 uIU/mL   ACETAMINOPHEN    Collection Time: 12/15/19  9:55 PM   Result Value Ref Range    Acetaminophen level <2 (L) 10.0 - 41.4 ug/mL   SALICYLATE    Collection Time: 12/15/19  9:55 PM   Result Value Ref Range    Salicylate level <3.8 (L) 2.8 - 20.0 MG/DL   EKG, 12 LEAD, INITIAL    Collection Time: 12/15/19 10:05 PM   Result Value Ref Range    Ventricular Rate 76 BPM    Atrial Rate 76 BPM    P-R Interval 174 ms    QRS Duration 82 ms    Q-T Interval 416 ms    QTC Calculation (Bezet) 468 ms    Calculated P Axis 51 degrees    Calculated R Axis -32 degrees    Calculated T Axis 53 degrees    Diagnosis       Sinus rhythm with fusion complexes  Left axis deviation  Moderate voltage criteria for LVH, may be normal variant  Abnormal ECG  When compared with ECG of 17-OCT-2019 14:21,  fusion complexes are now present     URINALYSIS W/ RFLX MICROSCOPIC    Collection Time: 12/15/19 10:40 PM   Result Value Ref Range    Color YELLOW      Appearance CLEAR      Specific gravity 1.026 1.005 - 1.030      pH (UA) 5.0 5.0 - 8.0      Protein NEGATIVE  NEG mg/dL    Glucose >1,000 (A) NEG mg/dL    Ketone TRACE (A) NEG mg/dL    Bilirubin NEGATIVE  NEG      Blood NEGATIVE  NEG      Urobilinogen 1.0 0.2 - 1.0 EU/dL    Nitrites NEGATIVE  NEG      Leukocyte Esterase NEGATIVE  NEG         EKG interpretation by ED Physician: Sinus rhythm rate of 76 bpm.  No acute ischemic changes. X-Ray, CT or other radiology findings or impressions:  CT HEAD WO CONT   Final Result   IMPRESSION:       There are 2 age indeterminant hypodense small subcortical infarcts high left   frontal lobe and a single age indeterminant right basal ganglia. No hemorrhage   or mass effect, no shift or hydrocephalus. Several chronic left hemispheric infarcts as detailed above. XR CHEST PORT    (Results Pending)       Progress notes, Consult notes or additional Procedure notes:     Consult: Spoke to Dr. Michael Kaminski, hospitalist, regarding patient's symptoms CT results, and concern for CVA. He agrees to accept to stroke neuro floor. Reevaluation of patient:   I have reassessed the patient. Patient is unchanged and resting comfortably in bed. No change in mental status. Discussed all results with patient's daughter need for admission and she verbalizes understanding agrees with this plan. Disposition:  Diagnosis:   1. Acute CVA (cerebrovascular accident) ([address]    2. Hyperglycemia        Disposition: Admit to stroke/neuro    Follow-up Information    None          Patient's Medications   Start Taking    No medications on file   Continue Taking    ASPIRIN DELAYED-RELEASE (ECOTRIN LOW STRENGTH) 81 MG TABLET    Take 81 mg by mouth daily. ATORVASTATIN (LIPITOR) 80 MG TABLET    Take 80 mg by mouth. INDOMETHACIN (INDOCIN) 50 MG CAPSULE        INDOMETHACIN (INDOCIN) 50 MG CAPSULE        INSULIN (NOVOLOG MIX 70-30 FLEXPEN) 100 UNIT/ML (70-30) FLEX PEN    7 Units by SubCUTAneous route three (3) times daily (with meals). INSULIN GLARGINE (LANTUS) 100 UNIT/ML INJECTION    30 Units by SubCUTAneous route nightly. LISINOPRIL (PRINIVIL, ZESTRIL) 10 MG TABLET    Take 10 mg by mouth. LISINOPRIL-HYDROCHLOROTHIAZIDE (PRINZIDE, ZESTORETIC) 20-12.5 MG PER TABLET    Take 2 Tabs by mouth daily. METFORMIN (GLUCOPHAGE) 500 MG TABLET    Take 500 mg by mouth two (2) times daily (with meals). METFORMIN ER (GLUCOPHAGE XR) 750 MG TABLET    Take  by mouth. METHOCARBAMOL (ROBAXIN) 500 MG TABLET        MIRABEGRON ER (MYRBETRIQ) 50 MG ER TABLET    Take 1 Tab by mouth daily. NAPROXEN (NAPROSYN) 375 MG TABLET    Take 1 Tab by mouth two (2) times daily (with meals). OMEPRAZOLE DELAYED RELEASE (PRILOSEC D/R) 20 MG TABLET    Take 20 mg by mouth daily. PANTOPRAZOLE (PROTONIX) 40 MG TABLET        PROMETHAZINE (PHENERGAN) 25 MG TABLET    Take 1 Tab by mouth every six (6) hours as needed.    These Medications have changed    No medications on file   Stop Taking    No medications on file

## 2019-12-16 NOTE — CONSULTS
Neurology   Consultation Note        Patient: Judy Saunders MRN: [id number]  SSN: [id number]    YOB: 1950  Age: 78 y.o. Sex: female        ID: 78year old left-handed female with history to include hypertension, diabetes, hyperlipidemia  CC: presents with confusion and dysarthria. HPI: The patient presented to the ED on 12/15/2019 with a history of confusion for the past 3 days. She was also noted to be dysarthric. She had symptoms of a upper respiratory infection for the past few weeks and she was taking TheraFlu and Mucinex recently. She has several family members at the bedside and one of them does endorse that the patient has had right upper extremity weakness over the past few weeks and complained of right shoulder pain. The patient is normally oriented. She has not been taking her medications regularly including metformin. She has not been taking aspirin or statin medication. Blood glucose on admission is in the 260s and hemoglobin A1c 14.1. UDS negative for infection. CT of the head reported multifocal age indeterminant but potentially acute/subacute infarcts in the right basal ganglia, left corona radiata to centrum semiovale. Social history: She lives on her own. She is normally independent with ADLs and she drives. She is retired from [employer] and from the [employer].  She was a medical technician. She denies tobacco, alcohol, or illicit drug use. Family history: Positive for diabetes and hypertension in her mother.      Past Medical History:   Diagnosis Date    Diabetes ([address]     Hypercholesteremia     Hypertension     Nausea & vomiting     UTI (urinary tract infection)         Past Surgical History:   Procedure Laterality Date    HX APPENDECTOMY      HX HYSTERECTOMY      HX ADELA AND BSO         Social History     Socioeconomic History    Marital status:      Spouse name: Not on file    Number of children: Not on file    Years of education: Not on file    Highest education level: Not on file   Occupational History    Not on file   Social Needs    Financial resource strain: Not on file    Food insecurity:     Worry: Not on file     Inability: Not on file    Transportation needs:     Medical: Not on file     Non-medical: Not on file   Tobacco Use    Smoking status: Former Smoker     Last attempt to quit: 2009     Years since quitting: 10.9    Smokeless tobacco: Never Used   Substance and Sexual Activity    Alcohol use: Yes     Comment: occasional    Drug use: No    Sexual activity: Not on file   Lifestyle    Physical activity:     Days per week: Not on file     Minutes per session: Not on file    Stress: Not on file   Relationships    Social connections:     Talks on phone: Not on file     Gets together: Not on file     Attends Ep[employer] service: Not on file     Active member of club or organization: Not on file     Attends meetings of clubs or organizations: Not on file     Relationship status: Not on file    Intimate partner violence:     Fear of current or ex partner: Not on file     Emotionally abused: Not on file     Physically abused: Not on file     Forced sexual activity: Not on file   Other Topics Concern    Not on file   Social History Narrative    Not on file        History reviewed. No pertinent family history.      Allergies   Allergen Reactions    Darvon [Propoxyphene] Nausea and Vomiting    Sulfabenzamide Hives and Rash        Current Facility-Administered Medications   Medication Dose Route Frequency    atorvastatin (LIPITOR) tablet 80 mg  80 mg Oral QHS    [START ON 12/17/2019] pantoprazole (PROTONIX) tablet 40 mg  40 mg Oral ACB    heparin (porcine) injection 5,000 Units  5,000 Units SubCUTAneous Q8H    labetalol (NORMODYNE;TRANDATE) 20 mg/4 mL (5 mg/mL) injection 5 mg  5 mg IntraVENous Q10MIN PRN    0.9% sodium chloride infusion  75 mL/hr IntraVENous CONTINUOUS    [START ON 12/17/2019] aspirin delayed-release tablet 81 mg  81 mg Oral DAILY    insulin lispro (HUMALOG) injection   SubCUTAneous AC&HS    glucose chewable tablet 16 g  4 Tab Oral PRN    glucagon (GLUCAGEN) injection 1 mg  1 mg IntraMUSCular PRN    dextrose 10% infusion 125-250 mL  125-250 mL IntraVENous PRN    [START ON 12/17/2019] amLODIPine (NORVASC) tablet 5 mg  5 mg Oral DAILY    [START ON 12/17/2019] lisinopril (PRINIVIL, ZESTRIL) tablet 10 mg  10 mg Oral DAILY    insulin glargine (LANTUS) injection 10 Units  10 Units SubCUTAneous QHS    acetaminophen (TYLENOL) tablet 650 mg  650 mg Oral Q6H PRN    ondansetron (ZOFRAN) injection 4 mg  4 mg IntraVENous Q6H PRN        Review of Systems:   As above otherwise 11 point review of systems negative including:  Constitutional: no fever or chills  Skin: denies rash or itching  HENT:  denies tinnitus, hearing loss. +cold symptoms, congestion. Eyes: denies diplopia , vision loss  Respiratory: denies shortness of breath  Cardiovascular: denies chest pain, dyspnea on exertion  Gastrointestinal: denies nausea, vomiting, diarrhea, constipation  Genitourinary: denies incontinence  Musculoskeletal: +right shoulder pain. Endocrine: denies weight change  Hematology: denies easy bruising or bleeding   Neurological: as above in HPI        Data:  Recent Results (from the past 24 hour(s))   GLUCOSE, POC    Collection Time: 12/15/19  9:49 PM   Result Value Ref Range    Glucose (POC) 371 (H) 70 - 110 mg/dL   CBC WITH AUTOMATED DIFF    Collection Time: 12/15/19  9:55 PM   Result Value Ref Range    WBC 6.1 4.6 - 13.2 K/uL    RBC 4.88 4.20 - 5.30 M/uL    HGB 13.3 12.0 - 16.0 g/dL    HCT 40.4 35.0 - 45.0 %    MCV 82.8 74.0 - 97.0 FL    MCH 27.3 24.0 - 34.0 PG    MCHC 32.9 31.0 - 37.0 g/dL    RDW 13.8 11.6 - 14.5 %    PLATELET 342 046 - 133 K/uL    MPV 10.6 9.2 - 11.8 FL    NEUTROPHILS 60 40 - 73 %    LYMPHOCYTES 33 21 - 52 %    MONOCYTES 5 3 - 10 %    EOSINOPHILS 2 0 - 5 %    BASOPHILS 0 0 - 2 %    ABS.  NEUTROPHILS 3.7 1.8 - 8.0 K/UL    ABS. LYMPHOCYTES 2.0 0.9 - 3.6 K/UL    ABS. MONOCYTES 0.3 0.05 - 1.2 K/UL    ABS. EOSINOPHILS 0.1 0.0 - 0.4 K/UL    ABS. BASOPHILS 0.0 0.0 - 0.1 K/UL    DF AUTOMATED     METABOLIC PANEL, COMPREHENSIVE    Collection Time: 12/15/19  9:55 PM   Result Value Ref Range    Sodium 138 136 - 145 mmol/L    Potassium 3.5 3.5 - 5.5 mmol/L    Chloride 104 100 - 111 mmol/L    CO2 25 21 - 32 mmol/L    Anion gap 9 3.0 - 18 mmol/L    Glucose 382 (H) 74 - 99 mg/dL    BUN 32 (H) 7.0 - 18 MG/DL    Creatinine 1.22 0.6 - 1.3 MG/DL    BUN/Creatinine ratio 26 (H) 12 - 20      GFR est AA 53 (L) >60 ml/min/1.73m2    GFR est non-AA 44 (L) >60 ml/min/1.73m2    Calcium 8.6 8.5 - 10.1 MG/DL    Bilirubin, total 0.5 0.2 - 1.0 MG/DL    ALT (SGPT) 19 13 - 56 U/L    AST (SGOT) 14 10 - 38 U/L    Alk.  phosphatase 135 (H) 45 - 117 U/L    Protein, total 7.6 6.4 - 8.2 g/dL    Albumin 3.1 (L) 3.4 - 5.0 g/dL    Globulin 4.5 (H) 2.0 - 4.0 g/dL    A-G Ratio 0.7 (L) 0.8 - 1.7     MAGNESIUM    Collection Time: 12/15/19  9:55 PM   Result Value Ref Range    Magnesium 2.5 1.6 - 2.6 mg/dL   PROTHROMBIN TIME + INR    Collection Time: 12/15/19  9:55 PM   Result Value Ref Range    Prothrombin time 14.1 11.5 - 15.2 sec    INR 1.1 0.8 - 1.2     PTT    Collection Time: 12/15/19  9:55 PM   Result Value Ref Range    aPTT 28.9 23.0 - 36.4 SEC   CARDIAC PANEL,(CK, CKMB & TROPONIN)    Collection Time: 12/15/19  9:55 PM   Result Value Ref Range    CK 61 26 - 192 U/L    CK - MB <1.0 <3.6 ng/ml    CK-MB Index  0.0 - 4.0 %     CALCULATION NOT PERFORMED WHEN RESULT IS BELOW LINEAR LIMIT    Troponin-I, QT <0.02 0.0 - 0.045 NG/ML   TSH 3RD GENERATION    Collection Time: 12/15/19  9:55 PM   Result Value Ref Range    TSH 1.46 0.36 - 3.74 uIU/mL   ACETAMINOPHEN    Collection Time: 12/15/19  9:55 PM   Result Value Ref Range    Acetaminophen level <2 (L) 10.0 - 13.5 ug/mL   SALICYLATE    Collection Time: 12/15/19  9:55 PM   Result Value Ref Range    Salicylate level <1.7 (L) 2.8 - 20.0 MG/DL   EKG, 12 LEAD, INITIAL    Collection Time: 12/15/19 10:05 PM   Result Value Ref Range    Ventricular Rate 76 BPM    Atrial Rate 76 BPM    P-R Interval 174 ms    QRS Duration 82 ms    Q-T Interval 416 ms    QTC Calculation (Bezet) 468 ms    Calculated P Axis 51 degrees    Calculated R Axis -32 degrees    Calculated T Axis 53 degrees    Diagnosis       Sinus rhythm with fusion complexes  Left axis deviation  Moderate voltage criteria for LVH, may be normal variant  Abnormal ECG  When compared with ECG of 17-OCT-2019 14:21,  fusion complexes are now present  Confirmed by Estee Lewis ([id number]) on 12/16/2019 7:46:17 AM     URINALYSIS W/ RFLX MICROSCOPIC    Collection Time: 12/15/19 10:40 PM   Result Value Ref Range    Color YELLOW      Appearance CLEAR      Specific gravity 1.026 1.005 - 1.030      pH (UA) 5.0 5.0 - 8.0      Protein NEGATIVE  NEG mg/dL    Glucose >1,000 (A) NEG mg/dL    Ketone TRACE (A) NEG mg/dL    Bilirubin NEGATIVE  NEG      Blood NEGATIVE  NEG      Urobilinogen 1.0 0.2 - 1.0 EU/dL    Nitrites NEGATIVE  NEG      Leukocyte Esterase NEGATIVE  NEG     GLUCOSE, POC    Collection Time: 12/16/19  1:00 AM   Result Value Ref Range    Glucose (POC) 266 (H) 70 - 110 mg/dL   GLUCOSE, POC    Collection Time: 12/16/19  8:22 AM   Result Value Ref Range    Glucose (POC) 269 (H) 70 - 110 mg/dL   HEMOGLOBIN A1C WITH EAG    Collection Time: 12/16/19 10:14 AM   Result Value Ref Range    Hemoglobin A1c 14.1 (H) 4.2 - 5.6 %    Est. average glucose 358 mg/dL   GLUCOSE, POC    Collection Time: 12/16/19 11:48 AM   Result Value Ref Range    Glucose (POC) 265 (H) 70 - 110 mg/dL              Vital Signs:  Patient Vitals for the past 24 hrs:   Temp Pulse Resp BP SpO2   12/16/19 1150 99 °F (37.2 °C) 75 18 136/76 97 %   12/16/19 1000 97 °F (36.1 °C) 72 18 155/74 99 %   12/16/19 0800 97 °F (36.1 °C) 73 18 134/75 99 %   12/16/19 0700 97.4 °F (36.3 °C) 72 17 110/54 100 %   12/16/19 0630  65 14 107/59    12/16/19 0615  68 19 125/62 100 %   12/16/19 0530  71 27 129/62 98 %   12/16/19 0315 97.6 °F (36.4 °C) 71 14 108/58 97 %   12/16/19 0230  78 18 120/55 98 %   12/16/19 0120 98.1 °F (36.7 °C) 76 18 114/56 99 %   12/16/19 0040 97.6 °F (36.4 °C) 71 14  98 %   12/15/19 2248  78 17  98 %   12/15/19 2242    146/65    12/15/19 2150  81 19  97 %   12/15/19 2149    114/58    12/15/19 2147    114/58    12/15/19 2133 98.8 °F (37.1 °C) 82 20 (!) 86/58 97 %         PHYSICAL EXAMINATION:      VITAL SIGNS:    Visit Vitals  /76 (BP 1 Location: Right arm, BP Patient Position: At rest)   Pulse 75   Temp 99 °F (37.2 °C)   Resp 18   Ht 5' 4\" (1.626 m)   Wt 72.6 kg (160 lb)   SpO2 97%   BMI 27.46 kg/m²       GENERAL: The patient is well developed, well nourished, and in no apparent distress. EXTREMITIES: No clubbing, cyanosis, or edema is identified. Pulses 2+ and symmetrical.  Muscle tone is normal.  HEAD:   Ear, nose, and throat appear to be without trauma. The patient is normocephalic. NEUROLOGIC EXAMINATION    MENTAL STATUS: The patient is awake, alert, and oriented to self. Knows she is in a medical center, states [place] or [facility].  Oriented to year as 2019. Does not state month, perseverates on 2019. Naming intact of simple items. Repetition intact. Does not states date of birth, instead she perseverates. Fund of knowledge is inadequate. She does not speak much. CRANIAL NERVES: II  Visual fields are full to confrontation. Pupils are both 3 mm and briskly reactive to light and accommodation. III, IV, VI  Extraocular movements are intact and there is no nystagmus. V  Facial sensation is intact to light touch. VII  Face is symmetrical.   VIII - Hearing is present. IX, X, [address] rises symmetrically. Gag is present. Tongue is in the midline. XI - Shoulder shrugging and head turning intact. MOTOR:  Mild RUE weakness, about 4/5 at delt and 4+/5 elsewhere.  +RUE pronator drift. Fine finger movements are symmetrical.  Tone is normal.  Sensory examination is reduced to PP throughout. Reflexes are trace in UE and not obtained in LE. Plantars are down going. FNF intact bilaterally. Gait exam is not tested at this time. CT HEAD WO CONT 12/16/2019:  Result Information     Status: Final result (Exam End: 12/16/2019 04:35) Provider Status: Open   Study Result     CT of the head without contrast     HISTORY: Acute CVA and hyperglycemia. Presenting with confusion and slurred  speech.     COMPARISON: CT 12/15/2019 and 2/3/2018     All CT scans at this facility are performed using dose optimization technique as  appropriate to a performed exam, to include automated exposure control,  adjustment of the MA and/or kV according to patient size (including appropriate  matching for site-specific examinations) or use of  iterative reconstruction  technique.     FINDINGS: No significant interval change compared to 12/15/2019 however the  multifocal parenchymal hypodensities are new since 2/3/2018. Age indeterminate  but suspected acute/subacute lacunar infarct in the right basal ganglia with a  small amount of hyperdensity at the periphery. Multifocal age indeterminant but  potentially acute/subacute infarcts in the left corona radiata to centrum  semiovale. No acute hemorrhage or mass lesion. Visualized paranasal sinuses and  mastoid air cells are clear. Calvarium is intact.     IMPRESSION  IMPRESSION:  Multifocal age indeterminant but potentially acute/subacute infarcts in the  right basal ganglia, left corona radiata to centrum semiovale. Small  hyperdensities around the right basal ganglia infarct may represent petechial  hemorrhage or calcification. Overall no significant interval change when  compared to 12/15/2019 although these are new since 2/3/2018.                  Assessment/Plan:   This is a 39-year-old left-handed female who presents with right upper extremity weakness and dysphasia who was found to have age-indeterminate infarcts in the right basal ganglia and left corona radiata to centrum semiovale. Small hyperdensities noted around the right basal ganglia infarct may represent petechial hemorrhage or calcification. She will have an MRI of the brain and MRA of the head and neck. She is on the stroke protocol. She is being monitored on telemetry and will have a TTE. Evaluations from therapy services. We discussed risk factors of diabetes which has been poorly controlled and hypertension. Lipid panel pending. Discussed no driving for now, and avoiding the decongestant medications. She is in need of a PCP on discharge. Discussed with patient with Dr. Hollie Batres. Madhavi Mejia NP     OK to DC from my standpoint    Above reviewed,edited where necessary and I agree. The patient was independently examined by this examiner.   Bernardo Yu M.D.

## 2019-12-16 NOTE — PROGRESS NOTES
Reason for Admission:  CVA (cerebral vascular accident) ([facility] [address] [I63.9]  Hyperglycemia [R73.9]                 RRAT Score:    9            Plan for utilizing home health: To be determined                      Likelihood of Readmission:   LOW                         Transition of Care Plan:              Initial assessment completed with patient. Cognitive status of patient: oriented to time, place, person and situation. Face sheet information confirmed:  yes. The patient designates Marquis More to participate in her discharge plan and to receive any needed information. This patient lives in a home   Patient is able to navigate steps as needed. Prior to hospitalization, patient was considered to be independent with ADLs/IADLS : yes . Patient has a current ACP document on file: no  The daughter will be available to transport patient home upon discharge. The patient already has no medical equipment available in the home. Patient is not currently active with home health. Patient has not stayed in a skilled nursing facility or rehab. Was  stay within last 60 days : no. This patient is on dialysis :no    Currently, the discharge plan is to be determined    The patient states that she can obtain her medications from the pharmacy, and take her medications as directed. Patient's current insurance is [employer] Medicare, [employer] Management Interventions  PCP Verified by CM: No(per pt, her pcp is with [facility])  Palliative Care Criteria Met (RRAT>21 & CHF Dx)?: No  Mode of Transport at Discharge:  Other (see comment)  Transition of Care Consult (CM Consult): Discharge Planning  Physical Therapy Consult: Yes  Occupational Therapy Consult: Yes  Speech Therapy Consult: Yes  Current Support Network: Lives Alone, Family Lives Nearby  Confirm Follow Up Transport: Family  Plan discussed with Pt/Family/Caregiver: Yes   Resource Information Provided?: Refused  Discharge Location  Discharge Placement: Unable to determine at this time      VINNIE Carbajal RN  Care Management  Pager: [phone]

## 2019-12-16 NOTE — ED NOTES
EMS instructed to notify pt is a fall risk; pt instructed to notify staff should she need assistance. Per ER MD no further POC/labs needed at this time. Pt remains more awake at this time on stretcher. No distress noted. Pt  equal; slight right arm drift noted. Pt remains calm/quiet; denies pain/dizziness/dyspnea/other complaints.

## 2019-12-16 NOTE — ED NOTES
Pt asleep; rouses easily to voice. Neuro check completed; pt remains easily rouseable and denies dizziness/shortness of breath/pain. Difficulty remains with mentation; continues to require extended thought to answer a question. Able to say her first name when asked repeatedly but then states \"2020\" when asked her last name but after extended effort can say her last name. Speech remains mostly clear but frequently has brief episodes of quiet/unintelligible speech. Sentences remain short. Airway remains patent; pt has had intermittent episodes of throat clearing but without difficulty; pt has remained NPO since arrival.  HOFFMAN x 4 without difficulty; when asked pt denies difficulty in sensation on either side. Pt has slightly perceptible lowering of right arm which returned/remained to its baseline extension position and is stable/not enough to describe as drift. Plan of care has been explained/understood by pt/family.

## 2019-12-16 NOTE — ROUTINE PROCESS
Bedside and Verbal shift change report given to Vanessa Noble RN (oncoming nurse) by Roverto Peralta RN 
 
(offgoing nurse). Report included the following information SBAR, Kardex and Recent Results.

## 2019-12-16 NOTE — PROGRESS NOTES
Problem: Communication Impaired (Adult)  Goal: *Acute Goals and Plan of Care (Insert Text)  Description  1. Pt will complete varied naming tasks with min A in 4/5 trials for improved ability to express basic wants/needs/ideas. 2. Pt will able to answer personally relevant orientation information in 4/5 trials for improved functional independence. 3.  Pt will be able to attend to task for 5 minutes given mod cues for improved functional independence/safety. 4. Pt will utilize compensatory speech strategies for improved ability to communicate basic wants/needs/ideas in 4/5 trials given min cues. Outcome: Progressing Towards Goal      SPEECH-LANGUAGE PATHOLOGY EVALUATION    Patient: Alan Strickland (1 y.o. female)  Date: 12/16/2019  Primary Diagnosis: CVA (cerebral vascular accident) ([facility] [address] [I63.9]  Hyperglycemia [R73.9]  Precautions: Aspiration     PLOF: As per H&P     ASSESSMENT :  Based on the objective data described below, the patient presents with cognitive linguistic impairments impacting functional independence and communication. [facility]. Pt oriented to person only, following one step commands with response delay noted. Motor speech and voice within functional limits. Pt able to complete confrontational naming tasks with 4/10 accuracy with mild perseveration noted, able to answer sentence completion tasks with 5/5 accuracy. Pt demo difficulty with generative naming task (unable to name animals in category despite mod cues/prompts) and demo difficulty with word finding in conversation. Recommend SLP to address deficits. Results/recommendations discussed with pt including role of SLP, environmental mods (reduce background distractions during conversation) with pt verbalizing understanding; however, question carryover. Will follow to address deficits. Patient will benefit from skilled intervention to address the above impairments.   Patient's rehabilitation potential is considered to be Fair  Factors which may influence rehabilitation potential include:   []              None noted  [x]              Mental ability/status  [x]              Medical condition  []              Home/family situation and support systems  [x]              Safety awareness  []              Pain tolerance/management  []              Other:      PLAN :  Recommendations and Planned Interventions:  As above   Frequency/Duration: Patient will be followed by speech-language pathology 1-2 times per day/3-7 days per week to address goals. Discharge Recommendations: To Be Determined     SUBJECTIVE:   Patient stated Armando Betancourt. OBJECTIVE:     Past Medical History:   Diagnosis Date    Diabetes ([address]     Hypercholesteremia     Hypertension     Nausea & vomiting     UTI (urinary tract infection)      Past Surgical History:   Procedure Laterality Date    HX APPENDECTOMY      HX HYSTERECTOMY      HX ADELA AND BSO       Prior Level of Function/Home Situation:      Mental Status:  Neurologic State: Alert  Orientation Level: Disoriented to time, Disoriented to situation, Disoriented to place, Oriented to person  Cognition: Impaired decision making  Motor Speech:  Oral-Motor Structure/Motor Speech  Labial: No impairment  Dentition: Intact; Natural  Oral Hygiene: Good  Lingual: No impairment  Velum: No impairment  Mandible: No impairment  Language Comprehension and Expression:  See above     PAIN:  Start of Eval: 0  End of Eval: 0     After treatment:   []              Patient left in no apparent distress sitting up in chair  [x]              Patient left in no apparent distress in bed  [x]              Call bell left within reach  [x]              Nursing notified  []              Caregiver present  []              Bed alarm activated    COMMUNICATION/EDUCATION:   [x] Patient/family have participated as able in goal setting and plan of care. []  Patient/family agree to work toward stated goals and plan of care.   []  Patient understands intent and goals of therapy, but is neutral about his/her participation.   [x]  Patient is unable to participate in goal setting and plan of care; education ongoing with interdisciplinary staff    Thank you for this referral,   More Lu M.S., [id number] [facility]  Speech-Language Pathologist

## 2019-12-16 NOTE — PROGRESS NOTES
conducted an initial consultation and Spiritual Assessment for Diane George, who is a 78 y.o.,female. Patients Primary Language is: [place]. According to the patients EMR Holiness Affiliation is: [facility].     The reason the Patient came to the hospital is:   Patient Active Problem List    Diagnosis Date Noted    Hyperglycemia 12/16/2019    CVA (cerebral vascular accident) ([facility] [address] 12/16/2019    Diabetes mellitus with hyperglycemia ([facility] [address] 12/16/2019        The  provided the following Interventions:  Initiated a relationship of care and support. Explored issues of claudia, belief, spirituality and Jainism/ritual needs while hospitalized. Listened empathically. Provided chaplaincy education. Provided information about Spiritual Care Services. Offered assurance of prayers on patient's behalf. Chart reviewed. The following outcomes where achieved:  Patient shared limited information about both their medical narrative and spiritual journey/beliefs.  confirmed Patient's Holiness Affiliation. Patient processed feeling about current hospitalization. Patient expressed gratitude for 's visit. Assessment:  Patient does not have any Jainism/cultural needs that will affect patients preferences in health care. There are no spiritual or Jainism issues which require intervention at this time. Plan:  Chaplains will continue to follow and will provide pastoral care on an as needed/requested basis.  recommends bedside caregivers page  on duty if patient shows signs of acute spiritual or emotional distress.     [address]   [phone]

## 2019-12-16 NOTE — ROUTINE PROCESS
TRANSFER - OUT REPORT: 
 
Verbal report given to Boris Bird RN(name) on Yesenia Cox  being transferred to [facility] room 406 (unit) for routine progression of care  For CVA Report consisted of patients Situation, Background, Assessment and  
Recommendations(SBAR). Pt NPO status discussed, as well as findings on NIH score, treatments initiated, plan of care. Concerns for difficulty with aspiration/falls/confusion discussed. Information from the following report(s) ED Summary was reviewed with the receiving nurse. Lines:  
Peripheral IV 12/15/19 Left Antecubital (Active) Site Assessment Clean, dry, & intact 12/15/2019  9:55 PM  
Phlebitis Assessment 0 12/15/2019  9:55 PM  
Infiltration Assessment 0 12/15/2019  9:55 PM  
Dressing Status Clean, dry, & intact 12/15/2019  9:55 PM  
Dressing Type Tape;Transparent 12/15/2019  9:55 PM  
Hub Color/Line Status Flushed 12/15/2019  9:55 PM  
Action Taken Blood drawn 12/15/2019  9:55 PM  
  
 
Opportunity for questions and clarification was provided. Patient transported with: 
 Monitor

## 2019-12-16 NOTE — ED NOTES
CT Head complete. Pt is more awake/alert/able to be more understood though speech remains slightly slurred/difficult to understand.

## 2019-12-16 NOTE — ED NOTES
Pt resting comfortably in bed with rails up x 2. Affect calm/conversant, respirations regular/non labored/skin warm and dry. Able to move to bedside commode with slight assistance. Denies dizziness/HA/shortness of breath/chest pain/other concerns. Pt daughter denies any difficulty swallowing/choking with food/fluids, or any history of dysphagia. Per daughter pt speech is per her baseline. Plan of care explained/understood.

## 2019-12-17 ENCOUNTER — APPOINTMENT (OUTPATIENT)
Dept: NON INVASIVE DIAGNOSTICS | Age: 69
DRG: 064 | End: 2019-12-17
Attending: INTERNAL MEDICINE
Payer: MEDICARE

## 2019-12-17 LAB
ANION GAP SERPL CALC-SCNC: 8 MMOL/L (ref 3–18)
BASOPHILS # BLD: 0 K/UL (ref 0–0.1)
BASOPHILS NFR BLD: 0 % (ref 0–2)
BUN SERPL-MCNC: 13 MG/DL (ref 7–18)
BUN/CREAT SERPL: 16 (ref 12–20)
CALCIUM SERPL-MCNC: 8.9 MG/DL (ref 8.5–10.1)
CHLORIDE SERPL-SCNC: 106 MMOL/L (ref 100–111)
CHOLEST SERPL-MCNC: 174 MG/DL
CO2 SERPL-SCNC: 26 MMOL/L (ref 21–32)
CREAT SERPL-MCNC: 0.8 MG/DL (ref 0.6–1.3)
DIFFERENTIAL METHOD BLD: NORMAL
ECHO AO ROOT DIAM: 2.99 CM
ECHO LA AREA 4C: 9.6 CM2
ECHO LA VOL 2C: 42.24 ML (ref 22–52)
ECHO LA VOL 4C: 18.49 ML (ref 22–52)
ECHO LA VOL BP: 30.78 ML (ref 22–52)
ECHO LA VOL/BSA BIPLANE: 17.3 ML/M2 (ref 16–28)
ECHO LA VOLUME INDEX A2C: 23.74 ML/M2 (ref 16–28)
ECHO LA VOLUME INDEX A4C: 10.39 ML/M2 (ref 16–28)
ECHO LV EDV TEICHHOLZ: 26.1 ML
ECHO LV ESV TEICHHOLZ: 9.48 ML
ECHO LV INTERNAL DIMENSION DIASTOLIC: 3.39 CM (ref 3.9–5.3)
ECHO LV INTERNAL DIMENSION SYSTOLIC: 2.25 CM
ECHO LV IVSD: 1.14 CM (ref 0.6–0.9)
ECHO LV MASS 2D: 140.9 G (ref 67–162)
ECHO LV MASS INDEX 2D: 79.2 G/M2 (ref 43–95)
ECHO LV POSTERIOR WALL DIASTOLIC: 1.19 CM (ref 0.6–0.9)
ECHO LVOT DIAM: 1.72 CM
ECHO LVOT PEAK GRADIENT: 5 MMHG
ECHO LVOT PEAK VELOCITY: 111.52 CM/S
ECHO LVOT VTI: 23.43 CM
ECHO MV A VELOCITY: 95.19 CM/S
ECHO MV E DECELERATION TIME (DT): 255 MS
ECHO MV E VELOCITY: 59.71 CM/S
ECHO MV E/A RATIO: 0.63
EOSINOPHIL # BLD: 0.1 K/UL (ref 0–0.4)
EOSINOPHIL NFR BLD: 2 % (ref 0–5)
ERYTHROCYTE [DISTWIDTH] IN BLOOD BY AUTOMATED COUNT: 13.8 % (ref 11.6–14.5)
GLUCOSE BLD STRIP.AUTO-MCNC: 145 MG/DL (ref 70–110)
GLUCOSE BLD STRIP.AUTO-MCNC: 231 MG/DL (ref 70–110)
GLUCOSE BLD STRIP.AUTO-MCNC: 235 MG/DL (ref 70–110)
GLUCOSE BLD STRIP.AUTO-MCNC: 317 MG/DL (ref 70–110)
GLUCOSE SERPL-MCNC: 109 MG/DL (ref 74–99)
HCT VFR BLD AUTO: 38.7 % (ref 35–45)
HDLC SERPL-MCNC: 34 MG/DL (ref 40–60)
HDLC SERPL: 5.1 {RATIO} (ref 0–5)
HGB BLD-MCNC: 13 G/DL (ref 12–16)
LDLC SERPL CALC-MCNC: 116.6 MG/DL (ref 0–100)
LIPID PROFILE,FLP: ABNORMAL
LVFS 2D: 33.69 %
LVOT MG: 3.23 MMHG
LVOT MV: 0.87 CM/S
LVSV (TEICH): 16.62 ML
LYMPHOCYTES # BLD: 2.7 K/UL (ref 0.9–3.6)
LYMPHOCYTES NFR BLD: 49 % (ref 21–52)
MCH RBC QN AUTO: 27.4 PG (ref 24–34)
MCHC RBC AUTO-ENTMCNC: 33.6 G/DL (ref 31–37)
MCV RBC AUTO: 81.5 FL (ref 74–97)
MONOCYTES # BLD: 0.4 K/UL (ref 0.05–1.2)
MONOCYTES NFR BLD: 7 % (ref 3–10)
MV DEC SLOPE: 2.34
NEUTS SEG # BLD: 2.3 K/UL (ref 1.8–8)
NEUTS SEG NFR BLD: 42 % (ref 40–73)
PLATELET # BLD AUTO: 242 K/UL (ref 135–420)
PMV BLD AUTO: 10.6 FL (ref 9.2–11.8)
POTASSIUM SERPL-SCNC: 3.2 MMOL/L (ref 3.5–5.5)
RBC # BLD AUTO: 4.75 M/UL (ref 4.2–5.3)
SODIUM SERPL-SCNC: 140 MMOL/L (ref 136–145)
TRIGL SERPL-MCNC: 117 MG/DL (ref ?–150)
VLDLC SERPL CALC-MCNC: 23.4 MG/DL
WBC # BLD AUTO: 5.6 K/UL (ref 4.6–13.2)

## 2019-12-17 PROCEDURE — 74011250637 HC RX REV CODE- 250/637: Performed by: NURSE PRACTITIONER

## 2019-12-17 PROCEDURE — 74011250636 HC RX REV CODE- 250/636: Performed by: NURSE PRACTITIONER

## 2019-12-17 PROCEDURE — 74011000250 HC RX REV CODE- 250: Performed by: HOSPITALIST

## 2019-12-17 PROCEDURE — 74011250637 HC RX REV CODE- 250/637: Performed by: INTERNAL MEDICINE

## 2019-12-17 PROCEDURE — 80048 BASIC METABOLIC PNL TOTAL CA: CPT

## 2019-12-17 PROCEDURE — 65660000000 HC RM CCU STEPDOWN

## 2019-12-17 PROCEDURE — 36415 COLL VENOUS BLD VENIPUNCTURE: CPT

## 2019-12-17 PROCEDURE — 80061 LIPID PANEL: CPT

## 2019-12-17 PROCEDURE — 93306 TTE W/DOPPLER COMPLETE: CPT

## 2019-12-17 PROCEDURE — 74011636637 HC RX REV CODE- 636/637: Performed by: NURSE PRACTITIONER

## 2019-12-17 PROCEDURE — 85025 COMPLETE CBC W/AUTO DIFF WBC: CPT

## 2019-12-17 PROCEDURE — 97165 OT EVAL LOW COMPLEX 30 MIN: CPT

## 2019-12-17 PROCEDURE — 97535 SELF CARE MNGMENT TRAINING: CPT

## 2019-12-17 PROCEDURE — 82962 GLUCOSE BLOOD TEST: CPT

## 2019-12-17 PROCEDURE — 97161 PT EVAL LOW COMPLEX 20 MIN: CPT

## 2019-12-17 RX ORDER — SODIUM CHLORIDE 9 MG/ML
10 INJECTION INTRAMUSCULAR; INTRAVENOUS; SUBCUTANEOUS
Status: COMPLETED | OUTPATIENT
Start: 2019-12-17 | End: 2019-12-17

## 2019-12-17 RX ORDER — INSULIN GLARGINE 100 [IU]/ML
14 INJECTION, SOLUTION SUBCUTANEOUS
Status: DISCONTINUED | OUTPATIENT
Start: 2019-12-17 | End: 2019-12-18 | Stop reason: HOSPADM

## 2019-12-17 RX ORDER — POTASSIUM CHLORIDE 20 MEQ/1
40 TABLET, EXTENDED RELEASE ORAL EVERY 4 HOURS
Status: COMPLETED | OUTPATIENT
Start: 2019-12-17 | End: 2019-12-17

## 2019-12-17 RX ADMIN — SODIUM CHLORIDE 10 ML: 9 INJECTION INTRAMUSCULAR; INTRAVENOUS; SUBCUTANEOUS at 09:55

## 2019-12-17 RX ADMIN — INSULIN LISPRO 4 UNITS: 100 INJECTION, SOLUTION INTRAVENOUS; SUBCUTANEOUS at 16:19

## 2019-12-17 RX ADMIN — INSULIN GLARGINE 14 UNITS: 100 INJECTION, SOLUTION SUBCUTANEOUS at 21:48

## 2019-12-17 RX ADMIN — LISINOPRIL 10 MG: 10 TABLET ORAL at 09:00

## 2019-12-17 RX ADMIN — INSULIN LISPRO 8 UNITS: 100 INJECTION, SOLUTION INTRAVENOUS; SUBCUTANEOUS at 11:33

## 2019-12-17 RX ADMIN — ATORVASTATIN CALCIUM 80 MG: 40 TABLET, FILM COATED ORAL at 21:50

## 2019-12-17 RX ADMIN — HEPARIN SODIUM 5000 UNITS: 5000 INJECTION INTRAVENOUS; SUBCUTANEOUS at 04:54

## 2019-12-17 RX ADMIN — POTASSIUM CHLORIDE 40 MEQ: 1500 TABLET, EXTENDED RELEASE ORAL at 17:22

## 2019-12-17 RX ADMIN — PANTOPRAZOLE SODIUM 40 MG: 40 TABLET, DELAYED RELEASE ORAL at 08:59

## 2019-12-17 RX ADMIN — HEPARIN SODIUM 5000 UNITS: 5000 INJECTION INTRAVENOUS; SUBCUTANEOUS at 12:31

## 2019-12-17 RX ADMIN — POTASSIUM CHLORIDE 40 MEQ: 1500 TABLET, EXTENDED RELEASE ORAL at 21:50

## 2019-12-17 RX ADMIN — AMLODIPINE BESYLATE 5 MG: 5 TABLET ORAL at 08:59

## 2019-12-17 RX ADMIN — ASPIRIN 81 MG: 81 TABLET, COATED ORAL at 08:59

## 2019-12-17 RX ADMIN — HEPARIN SODIUM 5000 UNITS: 5000 INJECTION INTRAVENOUS; SUBCUTANEOUS at 21:49

## 2019-12-17 RX ADMIN — INSULIN LISPRO 4 UNITS: 100 INJECTION, SOLUTION INTRAVENOUS; SUBCUTANEOUS at 21:48

## 2019-12-17 NOTE — DIABETES MGMT
Diabetes Patient/Family Education Record  Factors That  May Influence Patients Ability  to Learn or  Comply with Recommendations   []   Language barrier    []   Cultural needs   []   Motivation    []   Cognitive limitation    []   Physical   [x]   Education    []   Physiological factors   []   Hearing/vision/speaking impairment   []   Taoism beliefs    []   Financial factors   []  Other:   []  No factors identified at this time. Person Instructed:   [x]   Patient   [x]   Family: supportive daughter requested education and training to assist her mother.   []  Other     Preference for Learning:   [x]   Verbal   [x]   Written: diab educ packet   []  Demonstration     Level of Comprehension & Competence:    [x]  Good                                      [] Fair                                     []  Poor                             []  Needs Reinforcement   [x]  Teachback completed    Education Component:  Patient was seen earlier today by Daphne Zavala. Received a call from Lennox Cartagena NP hospitalist, this afternoon because daughter is in the room and requested education and training to help her mother. [x]  Medication management, including how to administer insulin (if appropriate) and potential medication interactions: Yes. Patient stated the following home diabetes medications:  Long acting insulin: Lantus insulin (vial) 10 units twice daily: every morning and bedtime but she does not always remember to take it. Fast acting insulin: Humalog sliding scale TID AC but she does not always remember to take it. Her daughter completed education and training and with satisfactory return demonstration. [x]  Nutritional management -obtain usual meal pattern: Yes. Daughter verbalized understanding about the important of following the nutrition recommendation for management of diabetes by eating 3 meals daily and educated on the plate method for serving size/portion control of carbs (starches, fruits, dairy) and limiting intake of concentrated sweets. [x]  Exercise: Yes. Follow the primary medical provider's exercise recommendations. [x]  Signs, symptoms, and treatment of hyperglycemia and hypoglycemia: Yes. Daughter verbalized understanding. [x] Prevention, recognition and treatment of hyperglycemia and hypoglycemia: Yes. Daughter verbalized understanding. [x]  Importance of blood glucose monitoring and how to obtain a blood glucose meter: Yes. Daughter completed education on how to use BG meter and the following recommendations:  Fasting blood sugar range before meals:   Random blood sugar two hours after meals: <180   [x]  Instruction on use of the blood glucose meter: Yes. Daughter completed training. [x]  Discuss the importance of HbA1C monitoring: Yes. Daughter verbalized understanding that her mother's current A1c level is 14.1% (12/16/2019) which is equivalent to estimated average blood glucose of 358 mg/dL during the past 2-3 months. The recommended A1c level is <7%. Discussed the list of potential complications of uncontrolled diabetes. []  Sick day guidelines   [x]  Proper use and disposal of lancets, needles, syringes or insulin pens (if appropriate): Yes. [x]  Potential long-term complications (retinopathy, kidney disease, neuropathy, foot care): Yes. [x] Information about whom to contact in case of emergency or for more information: Yes. [x]  Goal:  Patient/family will demonstrate understanding of Diabetes Self Management Skills by: 12/24/2019  Plan for post-discharge education or self-management support:    [x] Outpatient class schedule provided: Daughter receptive and accepted the class sched.            [] Patient Declined    [] Scheduled for outpatient classes (date) _______  Verify:  Does patient's daughter understand how diabetes medications work? Yes, after education. See notes above.   Does patient's daughter know what her mother's most recent A1c is? No. Informed and educated daughter. See notes above. Does patient's daughter monitor glucose at home? No, but she was educated today. See notes above. Does patient have difficulty obtaining diabetes medications and testing supplies?  No.       Serena Salcedo RN [facility]  Pager: [phone]

## 2019-12-17 NOTE — PROGRESS NOTES
[facility] Hospitalist Group  Progress Note    Patient: Diane George Age: 78 y.o. : 1950 MR#: [id number] SSN: [id number]  Date: 2019     Subjective:     No complaints, reports feeling much improved, denies weakness, SOB, CP, n/v or abd pain. Assessment/Plan:    1. Intermittent confusion, due to #3 - much improved, resolved at time of this visit  2. Multifocal acute infarcts involving right basal ganglia, left corona radiata to centrum semiovale - neurology following, cont asa only in light of petechial hemorrhage. Discussed with patient and daughter need for medication compliance / control of DM, hyperlipidemia and HTN. 3.  Diabetes mellitus with hyperglycemia - very poorly controlled, non compliant with insulin at home; DM educator following, discussed with patient and daughter education with daughter for education / monitoring  4. Hypertension - controlled, continue norvasc / lisinopril  5. Dyslipidemia - , cont high dose statin  6. Medical noncompliance - discussed need for  supervision, including for medication management / monitoring with daughter shahrzad  7.   Hypokalemia - replaced, follow in AM     Additional Notes:      Case discussed with:  [x]Patient  [x]Family  [x]Nursing  [x]Case Management  DVT Prophylaxis:  []Lovenox  [x]Hep SQ  []SCDs  []Coumadin   []On Heparin gtt    Objective:   VS:   Visit Vitals  /76   Pulse 70   Temp 97.5 °F (36.4 °C)   Resp 18   Ht 5' 4\" (1.626 m)   Wt 72.6 kg (160 lb)   SpO2 100%   Breastfeeding No   BMI 27.46 kg/m²      Tmax/24hrs: Temp (24hrs), Av.9 °F (36.6 °C), Min:97 °F (36.1 °C), Max:98.7 °F (37.1 °C)      Intake/Output Summary (Last 24 hours) at 2019 1214  Last data filed at 2019 0919  Gross per 24 hour   Intake    Output 600 ml   Net -600 ml     General:  Alert, NAD  Cardiovascular:  RRR  Pulmonary:  LSC throughout  GI:  +BS in all four quadrants, soft, non-tender  Extremities:  No edema; 2+ dorsalis pedis pulses bilaterally  Neuro: alert and oriented x 3; mild 4/5 RUE weakness; otherwise 5/5 throughout    Family contact: Daughter Colleen [place] [phone]    Labs:    Recent Results (from the past 24 hour(s))   GLUCOSE, POC    Collection Time: 12/16/19  3:49 PM   Result Value Ref Range    Glucose (POC) 195 (H) 70 - 110 mg/dL   GLUCOSE, POC    Collection Time: 12/16/19 10:19 PM   Result Value Ref Range    Glucose (POC) 189 (H) 70 - 110 mg/dL   LIPID PANEL    Collection Time: 12/17/19  3:58 AM   Result Value Ref Range    LIPID PROFILE          Cholesterol, total 174 <200 MG/DL    Triglyceride 117 <150 MG/DL    HDL Cholesterol 34 (L) 40 - 60 MG/DL    LDL, calculated 116.6 (H) 0 - 100 MG/DL    VLDL, calculated 23.4 MG/DL    CHOL/HDL Ratio 5.1 (H) 0 - 5.0     CBC WITH AUTOMATED DIFF    Collection Time: 12/17/19  3:58 AM   Result Value Ref Range    WBC 5.6 4.6 - 13.2 K/uL    RBC 4.75 4.20 - 5.30 M/uL    HGB 13.0 12.0 - 16.0 g/dL    HCT 38.7 35.0 - 45.0 %    MCV 81.5 74.0 - 97.0 FL    MCH 27.4 24.0 - 34.0 PG    MCHC 33.6 31.0 - 37.0 g/dL    RDW 13.8 11.6 - 14.5 %    PLATELET 525 367 - 412 K/uL    MPV 10.6 9.2 - 11.8 FL    NEUTROPHILS 42 40 - 73 %    LYMPHOCYTES 49 21 - 52 %    MONOCYTES 7 3 - 10 %    EOSINOPHILS 2 0 - 5 %    BASOPHILS 0 0 - 2 %    ABS. NEUTROPHILS 2.3 1.8 - 8.0 K/UL    ABS. LYMPHOCYTES 2.7 0.9 - 3.6 K/UL    ABS. MONOCYTES 0.4 0.05 - 1.2 K/UL    ABS. EOSINOPHILS 0.1 0.0 - 0.4 K/UL    ABS.  BASOPHILS 0.0 0.0 - 0.1 K/UL    DF AUTOMATED     METABOLIC PANEL, BASIC    Collection Time: 12/17/19  3:58 AM   Result Value Ref Range    Sodium 140 136 - 145 mmol/L    Potassium 3.2 (L) 3.5 - 5.5 mmol/L    Chloride 106 100 - 111 mmol/L    CO2 26 21 - 32 mmol/L    Anion gap 8 3.0 - 18 mmol/L    Glucose 109 (H) 74 - 99 mg/dL    BUN 13 7.0 - 18 MG/DL    Creatinine 0.80 0.6 - 1.3 MG/DL    BUN/Creatinine ratio 16 12 - 20      GFR est AA >60 >60 ml/min/1.73m2    GFR est non-AA >60 >60 ml/min/1.73m2 Calcium 8.9 8.5 - 10.1 MG/DL   GLUCOSE, POC    Collection Time: 12/17/19  6:50 AM   Result Value Ref Range    Glucose (POC) 145 (H) 70 - 110 mg/dL   ECHO ADULT COMPLETE    Collection Time: 12/17/19 10:04 AM   Result Value Ref Range    LA Volume 30.78 22 - 52 mL    Ao Root D 2.99 cm    LVIDd 3.39 (A) 3.9 - 5.3 cm    LVPWd 1.19 (A) 0.6 - 0.9 cm    LVIDs 2.25 cm    IVSd 1.14 (A) 0.6 - 0.9 cm    LVOT d 1.72 cm    LVOT Peak Velocity 111.52 cm/s    LVOT Peak Gradient 5.0 mmHg    LVOT VTI 23.43 cm    MV A Americo 95.19 cm/s    MV E Americo 59.71 cm/s    MV E/A 0.60     LA Vol 4C 18.49 (A) 22 - 52 mL    LA Vol 2C 42.24 22 - 52 mL    LA Area 4C 9.6 cm2    LV Mass .9 67 - 162 g    LV Mass AL Index 68.6 43 - 95 g/m2    Mitral Valve E Wave Deceleration Time 255.0 ms    LA Vol Index 17.30 16 - 28 ml/m2    LA Vol Index 23.74 16 - 28 ml/m2    LA Vol Index 10.39 16 - 28 ml/m2    Left Ventricular Fractional Shortening by 2D 97.913177783 %    Left Ventricular Outflow Tract Mean Gradient 0.9500378846 mmHg    Left Ventricular Outflow Tract Mean Velocity 7.3614457589 cm/s    Mitral Valve Deceleration [place] 9.470943302     Left Ventricular End Diastolic Volume by Teichholz Method 78.987003303 mL    Left Ventricular End Systolic Volume by Teichholz Method 5.7675210472 mL    Left Ventricular Stroke Volume by Teichholz Method 97.94872590 mL   GLUCOSE, POC    Collection Time: 12/17/19 11:18 AM   Result Value Ref Range    Glucose (POC) 317 (H) 70 - 110 mg/dL       Signed By: Debora Thompson NP     December 17, 2019

## 2019-12-17 NOTE — PROGRESS NOTES
ARU/IPR REFERRAL CONTACT NOTE  [address] [place] [address] for Physical Rehabilitation    RE: Fay Gilford     Thank you for the opportunity to review this patient's case for admission to [address] [place] [address] for Physical Rehabilitation. Based on our pre-admission screening:     [x ] This patient does not meet criteria for admission to [facility] for Physical        Rehabilitation due to:    [x ] Too functional, per documentation, patient does not require both Physical and Occupational Therapy Services at an acute rehabilitation level of intensity. Again, Thank you for this referral. Should you have any questions please do not hesitate to call. Sincerely,  Linda Godwin. Alex Umanzor, [id number]   Alex Umanzor, NIK  Admissions [facility] for Physical Rehabilitation  [phone]

## 2019-12-17 NOTE — PROGRESS NOTES
Stroke Education provided to patient and the following topics were discussed    1. Patients personal risk factors for stroke are hypertension, hyperlipidemia, diabetes mellitus and elevated HgA1C    2. Warning signs of Stroke:        * Sudden numbness or weakness of the face, arm or leg, especially on one side of          The body            * Sudden confusion, trouble speaking or understanding        * Sudden trouble seeing in one or both eyes        * Sudden trouble walking, dizziness, loss of balance or coordination        * Sudden severe headache with no known cause      3. Importance of activation Emergency Medical Services ( 9-1-1 ) immediately if experience any warning signs of stroke. 4. Be sure and schedule a follow-up appointment with your primary care doctor or any specialists as instructed. 5. You must take medicine every day to treat your risk factors for stroke. Be sure to take your medicines exactly as your doctor tells you: no more, no less. Know what your medicines are for , what they do. Anti-thrombotics /anticoagulants can help prevent strokes. You are taking the following medicine(s)  Aspirin and Lipitor     6. Smoking and second-hand smoke greatly increase your risk of stroke, cardiovascular disease and death. Smoking history never    7. Information provided was [facility] Stroke Education Asa Rush or Verbal Education    8. Documentation of teaching completed in Patient Education Activity and on Care Plan with teaching response noted?   yes

## 2019-12-17 NOTE — PROGRESS NOTES
PT eval order received and chart reviewed. Unable to see patient at this time as she was off unit. Will follow up as patient schedule allows.  Thank you for this referral. Geovany Peguero, PT, DPT

## 2019-12-17 NOTE — DIABETES MGMT
Diabetes Patient/Family Education Record  Factors That  May Influence Patients Ability  to Learn or  Comply with Recommendations   []   Language barrier    []   Cultural needs   [x]   Motivation    []   Cognitive limitation    []   Physical   [x]   Education    []   Physiological factors   []   Hearing/vision/speaking impairment   []   Bahai beliefs    []   Financial factors   []  Other:   []  No factors identified at this time. Person Instructed:   [x]   Patient   []   Family   []  Other     Preference for Learning:   [x]   Verbal   [x]   Written   []  Demonstration     Level of Comprehension & Competence:    []  Good                                      [x] Fair                                     []  Poor                             [x]  Needs Reinforcement   [x]  Teachback completed    Education Component:   [x]  Medication management, including how to administer insulin (if appropriate) and potential medication interactions Pt reports taking Lantus insulin 10 units nightly using insulin pen (reports missing ~ 3 doses weekly). Also takes Glipizide but often misses doses. Reviewed importance of taking medications as prescribed and regular follow up with doctor for needed adjustements   [x]  Nutritional management -obtain usual meal pattern Reviewed portion control, foods with carbohydrates, and avoiding concentrated sweets. []  Exercise   [x]  Signs, symptoms, and treatment of hyperglycemia and hypoglycemia   [x] Prevention, recognition and treatment of hyperglycemia and hypoglycemia   [x]  Importance of blood glucose monitoring and how to obtain a blood glucose meter has a glucometer and able to get supplies but only checking blood sugar once weekly prior to admission.    []  Instruction on use of the blood glucose meter   [x]  Discuss the importance of HbA1C monitoring  14.1% (estimated average glucose of 358 mg/dL)   []  Sick day guidelines   [x]  Proper use and disposal of lancets, needles, syringes or insulin pens (if appropriate)   [x]  Potential long-term complications (retinopathy, kidney disease, neuropathy, foot care)   [] Information about whom to contact in case of emergency or for more information    [x]  Goal:  Patient/family will demonstrate understanding of Diabetes Self Management Skills by: 12/24/19  Plan for post-discharge education or self-management support:    [x] Outpatient class schedule provided            [] Patient Declined    [] Scheduled for outpatient classes (date) _______  Verify:  Does patient understand how diabetes medications work? reviewed  Does patient know what their most recent A1c is? reviewed  Does patient monitor glucose at home? yes  Does patient have difficulty obtaining diabetes medications or testing supplies?  None noted       Dilip Mcdaniels RD, CDE

## 2019-12-17 NOTE — PROGRESS NOTES
PHYSICAL THERAPY EVALUATION AND DISCHARGE    Patient: Karlene Chandler (39 y.o. female)  Date: 12/17/2019  Primary Diagnosis: CVA (cerebral vascular accident) ([facility] [address] [I63.9]  Hyperglycemia [R73.9]    Precautions: Fall    ASSESSMENT :  Patient is 66yo F admitted to CVA for hyperglycemia and presents today alert and agreeable to therapy and was supine in bed upon arrival. Patient transferred to sitting EOB for objective assessment and transferred to standing and ambulated 250ft. Patient gait steady, no path deviations. Patient then negotiated 10 steps with HR and returned to supine in bed at conclusion of session. Patient would benefit from transfer bench for endurance and safety in bathroom. Patient does not require further skilled intervention at this level of care. PLAN :  Recommendations and Planned Interventions:  No formal PT needs identified at this time. Discharge Recommendations: None  Further Equipment Recommendations for Discharge: Transfer bench for tub     SUBJECTIVE:   Patient stated I feel pretty good.     OBJECTIVE DATA SUMMARY:     Past Medical History:   Diagnosis Date    Diabetes ([facility] [address]     Hypercholesteremia     Hypertension     Nausea & vomiting     UTI (urinary tract infection)      Past Surgical History:   Procedure Laterality Date    HX APPENDECTOMY      HX HYSTERECTOMY      HX ADELA AND BSO       Barriers to Learning/Limitations: None  Compensate with: N/A  Home Situation:   Home Situation  Home Environment: Private residence  # Steps to Enter: 3  One/Two Story Residence: One story  Living Alone: No  Support Systems: Child(daya), Family member(s)  Patient Expects to be Discharged to[de-identified] Private residence  Current DME Used/Available at Home: None  Critical Behavior:  Neurologic State: Alert  Orientation Level: Disoriented to place; Disoriented to time;Oriented to person;Oriented to situation  Cognition: Appropriate for age attention/concentration; Follows commands   Strength:    Strength:  Within functional limits(BLE)   Tone & Sensation:   Tone: Normal(BLE)   Sensation: Intact(BLE)   Range Of Motion:  AROM: Within functional limits(BLE)    Functional Mobility:  Bed Mobility:  Rolling: Modified independent  Supine to Sit: Modified independent  Sit to Supine: Modified independent  Scooting: Modified independent  Transfers:  Sit to Stand: Modified independent  Stand to Sit: Modified independent  Balance:   Sitting: Intact  Standing: Intact  Ambulation/Gait Training:  Distance (ft): 250 Feet (ft)   Ambulation - Level of Assistance: Modified independent   Stairs:  Number of Stairs Trained: 10  Stairs - Level of Assistance: Modified independent  Rail Use: Right   Pain:  Pain level pre-treatment: 0/10   Pain level post-treatment: 0/10    Activity Tolerance:   Patient tolerated activity fair and was left resting with call bell by his side  Please refer to the flowsheet for vital signs taken during this treatment. After treatment:   []         Patient left in no apparent distress sitting up in chair  []         Patient left in no apparent distress in bed  [x]         Call bell left within reach  [x]         Nursing notified  []         Caregiver present  []         Bed alarm activated  []         SCDs applied    COMMUNICATION/EDUCATION:   [x]         Role of Physical Therapy in the acute care setting. [x]         Fall prevention education was provided and the patient/caregiver indicated understanding. [x]         Patient/family have participated as able in goal setting and plan of care. [x]         Patient/family agree to work toward stated goals and plan of care. []         Patient understands intent and goals of therapy, but is neutral about his/her participation. []         Patient is unable to participate in goal setting/plan of care: ongoing with therapy staff.  []         Other:     Thank you for this referral.  Irian Vann, PT   Time Calculation: 15 mins      Eval Complexity: History: LOW Complexity : Zero comorbidities / personal factors that will impact the outcome / POCExam:LOW Complexity : 1-2 Standardized tests and measures addressing body structure, function, activity limitation and / or participation in recreation  Presentation: LOW Complexity : Stable, uncomplicated  Clinical Decision Making:Low Complexity    Overall Complexity:LOW

## 2019-12-17 NOTE — PROGRESS NOTES
ARU/IPR REFERRAL CONTACT NOTE  [address] [place] [address] Physical Rehabilitation    RE: Nicklas Buerger    Referral received to review this patient's case for admission to [address] [place] [address] Physical Rehabilitation. Current status reviewed.  When appropriate, will need PT/OT evaluation/treatment on this patient to complete the pre-admission evaluation.  Will continue to follow. Thank you for this referral.  Should you have any questions please do not hesitate to call. Sincerely,  Alexa Hernandez.  [address] [place] [address] Physical Rehabilitation  [phone]

## 2019-12-17 NOTE — ROUTINE PROCESS
Primary Nurse Meagan Hudson RN and Kamar Leigh RN performed a dual skin assessment on this patient No impairment noted Ranjit score is 21. Pt has old surgical scar on left side of face.

## 2019-12-17 NOTE — PROGRESS NOTES
Problem: Self Care Deficits Care Plan (Adult)  Goal: *Acute Goals and Plan of Care (Insert Text)  Outcome: Resolved/Met  OCCUPATIONAL THERAPY EVALUATION/DISCHARGE    Patient: Nicklas Buerger (61 y.o. female)  Date: 12/17/2019  Primary Diagnosis: CVA (cerebral vascular accident) ([facility] [address] [I63.9]  Hyperglycemia [R73.9]        Precautions:      PLOF: Pt reports she lives alone in a [facility]. Pt was (I) with basic self-care/ADLs, IADLs, and functional mobility without AD PTA. ASSESSMENT AND RECOMMENDATIONS:  Pt cleared to participate in OT evaluation by Rn. Upon entering room, pt supine with HOB elevated, alert, and agreeable to therapy session. Based on the objective data described below, the patient presents she is (I) with basic self-care/ADLs. Pt denied pain or dizziness during session. Educated pt on the role of Ot, evaluation process, and fall prevention with pt demo good understanding. As pt presents she is at her baseline, skilled occupational therapy is not indicated at this time.     Discharge Recommendations: None  Further Equipment Recommendations for Discharge: N/A     SUBJECTIVE:   Patient stated I'll have help    OBJECTIVE DATA SUMMARY:     Past Medical History:   Diagnosis Date    [facility]     Hypercholesteremia     Hypertension     Nausea & vomiting     UTI (urinary tract infection)      Past Surgical History:   Procedure Laterality Date    HX APPENDECTOMY      HX HYSTERECTOMY      HX ADELA AND BSO       Barriers to Learning/Limitations: None  Compensate with: visual, verbal, tactile, kinesthetic cues/model    Home Situation:   Home Situation  Home Environment: Private residence  # Steps to Enter: 3  One/Two Story Residence: One story  Living Alone: No  Support Systems: Child(daya), Family member(s)  Patient Expects to be Discharged to[de-identified] Private residence  Current DME Used/Available at Home: None  Tub or Shower Type: Shower  [x]     Right hand dominant   []     Left hand dominant    Cognitive/Behavioral Status:  Neurologic State: Alert  Orientation Level: Oriented to person  Cognition: Follows commands  Safety/Judgement: Fall prevention    Skin: Visible skin appeared intact  Edema: None noted    Vision/Perceptual:    Pt is able to read the clock without difficulty      Coordination: BUE  Coordination: Within functional limits  Fine Motor Skills-Upper: Left Intact; Right Intact    Gross Motor Skills-Upper: Left Intact; Right Intact    Balance:  Sitting: Intact  Standing: Intact    Strength: BUE  Strength: Within functional limits    Tone & Sensation: BUE  Tone: Normal  Sensation: Intact    Range of Motion: BUE  AROM: Within functional limits         Functional Mobility and Transfers for ADLs:  Bed Mobility:  Rolling: Modified independent  Supine to Sit: Modified independent  Sit to Supine: Modified independent  Scooting: Modified independent  Transfers:  Sit to Stand: Independent  Stand to Sit: Independent    Toilet Transfer : Independent   Bathroom Mobility: Independent    ADL Assessment:  Feeding: Independent    Oral Facial Hygiene/Grooming: Independent    Bathing: Modified independent    Upper Body Dressing: Independent    Lower Body Dressing: Independent    Toileting: Modified independent      ADL Intervention:  Grooming at sink level: Independent    Lower Body Dressing Assistance  Dressing Assistance: Independent  Socks: Independent  Position Performed: Seated edge of bed    Toileting  Toileting Assistance: Modified independent  Bladder Hygiene: Modified independent  Clothing Management: Modified independent    Cognitive Retraining  Safety/Judgement: Fall prevention    Pain:  Pain level pre-treatment: 0/10   Pain level post-treatment: 0/10   Pain Intervention(s): Medication (see MAR); Rest, Ice, Repositioning  Response to intervention: Nurse notified, See doc flow    Activity Tolerance:   Good    Please refer to the flowsheet for vital signs taken during this treatment.   After treatment:   [x]  Patient left in no apparent distress sitting up in chair  [x]  Patient left in no apparent distress in bed  [x]  Call bell left within reach  [x]  Nursing notified  []  Caregiver present  []  Bed alarm activated    COMMUNICATION/EDUCATION:   [x]      Role of Occupational Therapy in the acute care setting  [x]      Home safety education was provided and the patient/caregiver indicated understanding. [x]      Patient/family have participated as able and agree with findings and recommendations. []      Patient is unable to participate in plan of care at this time. Thank you for this referral.  Anola Pain, MS, OTR/L  Time Calculation: 23 mins      Eval Complexity: History: LOW Complexity : Brief history review ; Examination: LOW Complexity : 1-3 performance deficits relating to physical, cognitive , or psychosocial skils that result in activity limitations and / or participation restrictions ;    Decision Making:LOW Complexity : No comorbidities that affect functional and no verbal or physical assistance needed to complete eval tasks

## 2019-12-17 NOTE — ROUTINE PROCESS
Bedside and Verbal shift change report given to Vaughn Wu RN (oncoming nurse) by Josiane Ibarra RN (offgoing nurse). Report included the following information SBAR, Kardex, Intake/Output, MAR, Recent Results and Cardiac Rhythm sinus rhythm.

## 2019-12-17 NOTE — PROGRESS NOTES
Problem: Falls - Risk of  Goal: *Absence of Falls  Description  Document Rosa Cosmorafiq Fall Risk and appropriate interventions in the flowsheet.   Outcome: Progressing Towards Goal  Note: Fall Risk Interventions:       Mentation Interventions: Adequate sleep, hydration, pain control, Bed/chair exit alarm, Door open when patient unattended    Medication Interventions: Patient to call before getting OOB, Teach patient to arise slowly    Elimination Interventions: Call light in reach              Problem: TIA/CVA Stroke: 0-24 hours  Goal: Activity/Safety  Outcome: Progressing Towards Goal  Goal: Consults, if ordered  Outcome: Progressing Towards Goal  Goal: Diagnostic Test/Procedures  Outcome: Resolved/Met  Goal: Nutrition/Diet  Outcome: Progressing Towards Goal  Goal: Medications  Outcome: Progressing Towards Goal  Goal: Respiratory  Outcome: Resolved/Met  Goal: Psychosocial  Outcome: Progressing Towards Goal  Goal: *Hemodynamically stable  Outcome: Progressing Towards Goal  Goal: *Ability to perform ADLs and demonstrates progressive mobility and function  Outcome: Progressing Towards Goal

## 2019-12-17 NOTE — PROGRESS NOTES
Re:  [facility]ess up visit     12/17/2019 12:42 PM    SSN: [id number]    Subjective:   Nancy Chi is seen in follow-up. She is sitting in the recliner. No family at bedside at present. She denies complaints. No headache.   Reports ambulating in the hallway and on the stairs with the physical therapist.    Medications:    Current Facility-Administered Medications   Medication Dose Route Frequency Provider Last Rate Last Dose    atorvastatin (LIPITOR) tablet 80 mg  80 mg Oral QHS Cathlene Feathers B, NP   80 mg at 12/16/19 2249    pantoprazole (PROTONIX) tablet 40 mg  40 mg Oral ACB Cathlene Feathers B, NP   40 mg at 12/17/19 0859    heparin (porcine) injection 5,000 Units  5,000 Units SubCUTAneous Q8H Cathlene Feathers B, NP   5,000 Units at 12/17/19 1231    labetalol (NORMODYNE;TRANDATE) 20 mg/4 mL (5 mg/mL) injection 5 mg  5 mg IntraVENous Q10MIN PRN Cathlene Feathers B, NP        0.9% sodium chloride infusion  75 mL/hr IntraVENous CONTINUOUS Cathlene Feathers B, NP 75 mL/hr at 12/16/19 1752 75 mL/hr at 12/16/19 1752    aspirin delayed-release tablet 81 mg  81 mg Oral DAILY Cathlene Feathers B, NP   81 mg at 12/17/19 0859    insulin lispro (HUMALOG) injection   SubCUTAneous AC&HS Cathlene Feathers B, NP   8 Units at 12/17/19 1133    glucose chewable tablet 16 g  4 Tab Oral PRN Bonniedoug Yarbrough, KIRILL        glucagon (GLUCAGEN) injection 1 mg  1 mg IntraMUSCular PRN Cathlene Feathers B, NP        dextrose 10% infusion 125-250 mL  125-250 mL IntraVENous PRN Shakir Sanders MD        amLODIPine (NORVASC) tablet 5 mg  5 mg Oral DAILY Maia Light MD   5 mg at 12/17/19 0859    lisinopril (PRINIVIL, ZESTRIL) tablet 10 mg  10 mg Oral DAILY Maia Light MD   10 mg at 12/17/19 0900    insulin glargine (LANTUS) injection 10 Units  10 Units SubCUTAneous QHS Maia Light MD   10 Units at 12/16/19 2250    acetaminophen (TYLENOL) tablet 650 mg  650 mg Oral Q6H PRN Kathleen Barajas MD        [facility]ron [facility]) injection 4 mg  4 mg IntraVENous Q6H PRN Kathleen Barajas MD        influenza vaccine 2019-20 (6 mos+)(PF) (FLUARIX/FLULAVAL/FLUZONE QUAD) injection 0.5 mL  0.5 mL IntraMUSCular PRIOR TO DISCHARGE Kathleen Barajas MD           Vital signs:    Visit Vitals  /79 (BP 1 Location: Right arm, BP Patient Position: Supine)   Pulse 81   Temp 98.3 °F (36.8 °C)   Resp 17   Ht 5' 4\" (1.626 m)   Wt 72.6 kg (160 lb)   SpO2 97%   Breastfeeding No   BMI 27.46 kg/m²       Review of Systems:   As above otherwise 11 point review of systems negative including:  Constitutional no fever or chills  Skin denies rash or itching  HEENT  Denies tinnitus, hearing loss  Eyes denies diplopia or vision loss  Respiratory denies shortness of breath  Cardiovascular denies chest pain, dyspnea on exertion  Gastrointestinal denies nausea, vomiting, diarrhea, constipation  Genitourinary denies incontinence  Musculoskeletal denies joint pain or swelling  Endocrine denies weight change  Hematology denies easy bruising or bleeding   Neurological as above in HPI      Patient Active Problem List   Diagnosis Code    Hyperglycemia R73.9    CVA (cerebral vascular accident) ([facility] [address] I63.9    Diabetes mellitus with hyperglycemia ([facility] [address] E11.65         Objective: The patient is awake, alert, and oriented x 1. States month as November. Fund of knowledge is inadequate. Naming intact of simple items. She has dysphasia. Cranial Nerves: II  Visual fields are full to confrontation. III, IV, VI  Extraocular movements are intact. There is no nystagmus. V  Facial sensation is intact to pinprick. VII  Face is symmetrical.  VIII - Hearing is present. IX, X, XII  Palate is symmetrical.   XI - Shoulder shrugging and head turning intact  Motor:  Mild RUE weakness, about 4/5 at delt and 4+/5 elsewhere. Mild RUE pronation but minimal drift.   Tone is normal. Reflexes are trace and symmetrical. Plantars are down going. Gait is not tested at this time. CBC:   Lab Results   Component Value Date/Time    WBC 5.6 12/17/2019 03:58 AM    RBC 4.75 12/17/2019 03:58 AM    HGB 13.0 12/17/2019 03:58 AM    HCT 38.7 12/17/2019 03:58 AM    PLATELET 323 76/67/0485 03:58 AM     BMP:   Lab Results   Component Value Date/Time    Glucose 109 (H) 12/17/2019 03:58 AM    Sodium 140 12/17/2019 03:58 AM    Potassium 3.2 (L) 12/17/2019 03:58 AM    Chloride 106 12/17/2019 03:58 AM    CO2 26 12/17/2019 03:58 AM    BUN 13 12/17/2019 03:58 AM    Creatinine 0.80 12/17/2019 03:58 AM    Calcium 8.9 12/17/2019 03:58 AM     CMP:   Lab Results   Component Value Date/Time    Glucose 109 (H) 12/17/2019 03:58 AM    Sodium 140 12/17/2019 03:58 AM    Potassium 3.2 (L) 12/17/2019 03:58 AM    Chloride 106 12/17/2019 03:58 AM    CO2 26 12/17/2019 03:58 AM    BUN 13 12/17/2019 03:58 AM    Creatinine 0.80 12/17/2019 03:58 AM    Calcium 8.9 12/17/2019 03:58 AM    Anion gap 8 12/17/2019 03:58 AM    BUN/Creatinine ratio 16 12/17/2019 03:58 AM    Alk.  phosphatase 135 (H) 12/15/2019 09:55 PM    Protein, total 7.6 12/15/2019 09:55 PM    Albumin 3.1 (L) 12/15/2019 09:55 PM    Globulin 4.5 (H) 12/15/2019 09:55 PM    A-G Ratio 0.7 (L) 12/15/2019 09:55 PM     Coagulation:   Lab Results   Component Value Date/Time    Prothrombin time 14.1 12/15/2019 09:55 PM    INR 1.1 12/15/2019 09:55 PM    aPTT 28.9 12/15/2019 09:55 PM     Cardiac markers:   Lab Results   Component Value Date/Time    CK 61 12/15/2019 09:55 PM    CK-MB Index  12/15/2019 09:55 PM     CALCULATION NOT PERFORMED WHEN RESULT IS BELOW LINEAR LIMIT     ABGs: No results found for: PH, PO2, PCO2, HCO3, BD, BE  Radiology review:       MRI BRAIN WO CONT 12/16/2019:  Result Information     Status: Final result (Exam End: 12/16/2019 22:30) Provider Status: Open   Study Result     Brain MR without contrast     History: Slurred speech, evaluate CVA     Comparison: CT head 12/16/2019     Technique: Multisequence multiplanar MR imaging acquired through the brain. Includes diffusion imaging and heme sensitive imaging.     Contrast used: None     Findings:      Parenchyma: Multiple foci of restricted diffusion compatible with acute ischemia  in the left corona radiata to centrum semiovale. Peripherally T2/FLAIR  hyperintense focus in the right basal ganglia (including the superior caudate  head and lentiform nucleus) with punctate restricted diffusion and peripheral T1  hyperintensity and mildly increased susceptibility, most likely subacute infarct  with petechial left hemorrhage. No evidence of acute hematoma. No mass effect.      CSF spaces: Ventricles and cisterns remain midline in position     IAC regions: Unremarkable     Parasellar region: Unremarkable     Vasculature: Appropriate flow voids within the major skull base vasculature,  better evaluation on the same day MRA.     Cervicomedullary junction: Patent     Orbits: Unremarkable     Paranasal sinuses and mastoid air cells: Clear      Calvarium and upper cervical spine: Unremarkable     IMPRESSION  IMPRESSION:     1. Several foci of acute ischemic infarcts in the left corona radiata to  centrum semiovale. 2. Right basal ganglia subacute infarct with evidence of petechial hemorrhage.            MRA BRAIN WO CONT 12/16/2019:  Result Information     Status: Final result (Exam End: 12/16/2019 22:30) Provider Status: Open   Study Result     PROCEDURE:  MRA brain arteries without contrast.  CPT CODE:  53548  HISTORY: As per same time neck MRA  COMPARISON: Same time brain MR., neck MRA  TECHNIQUE: Brain arteries were scanned from near foramen magnum to  superventricular level with 3-D time-of-flight technique. Appropriate reformats  were accomplished.   FINDINGS:  Significantly motion degraded scan.     Posterior circulation  -Dominant RVA may be mildly stenotic  -Nondominant and small LVA has diminished flow signal which may indicate slow  flow or potentially reversed flow  -Basilar artery not significantly stenotic  -LPCOMM and RPCOMM are dominant supply to not significantly stenotic LPCA/RPCA     Anterior circulation  -\"Appearance\" of multi segment stenoses (carotids, anterior and middle cerebral  arteries), which appear up to high-grade/severe. Some of these are probably  artifactual but some may be real  -Diminished flow signal intensity superior division M2 segment LMCA may indicate  flow limitation        IMPRESSION  IMPRESSION:  1. Significantly motion compromised exam. \"Appearance\" of extensive high-grade  anterior circulation stenoses, likely combination of artifactual and \"real\"  -Flow limitation only specifically suggested at superior M2 division LMCA  2. At the posterior circulation, suggested abnormal flow (diminished or  reversed) nondominant LVA  -No flow limitation suggested at other arteries         MRA NECK WO CONT 12/16/2019:  Result Information     Status: Final result (Exam End: 12/16/2019 22:30) Provider Status: Open   Study Result     MRA NECK ARTERIES WITHOUT AND WITH CONTRAST  CPT CODE: 65808  HISTORY: Confusion, dysarthria. COMPARISON:   -Same time brain MR  -Same time MRA neck. TECHNIQUE: Neck arteries were scanned with 2D TOF technique, non contrast, in  the region of the carotid bifurcations. Neck arteries were scanned with a  volume acquisition technique, without and with gadolinium, from around the level  of the aortic arch up to around the level of the skull base. Appropriate  reformats were performed. FINDINGS:  Aortic arch:Artifact obscured. No pathology identified  RBCA:Artifact obscured. Flow present  RSCA:Artifact obscured. Suggested in-line flow  RVA:In-line flow. Otherwise limited information  RCCA:Proximal artery artifact obscured with in-line flow. Nonstenotic more  distally  YANG:Some bulbar artifact. Suggestion 25 % diameter bulb stenosis by NASCET  criteria.  May have mild/moderate post bulbar stenosis at a sharp turn  RECA:Artifact obscured. Flow present  LCCA:Proximally artifact obscured, with in-line flow. More distally, nonstenotic  LICA:Bulb 25 % diameter stenosis by NASCET criteria suggested. May have mild  post bulb stenosis at sharp turns  LECA:Flow present  LSCA:Significantly artifact obscured. Flow can only be confirmed and a portion  of the artery  LVA:Small and nondominant artery. Flow visualized through most levels but cannot  be confirmed throughout. . Direction of flow is indeterminate. The TOF study does  not show flow in this artery which suggests slow flow or reverse flow        IMPRESSION  IMPRESSION:  1. Multiple arterial segments are not effectively evaluated  2. Carotid bulbs mildly stenotic  3. Dominant RVA has in-line flow  4. Small and nondominant LVA may have slow flow or reversed flow and may be  stenotic/occluded. .     Thank you for this referral.             Assessment: This is a 69-year-old left-handed female with a left corona radiata to centrum semiovale AIS and right basal ganglia subacute infarct with petechial hemorrhage who has risk factors including hypertension, dyslipidemia, and poorly controlled diabetes. MRA was limited due to motion but reported the appearance of extensive high-grade anterior circulation stenoses, thought to be a combination of artifactual and real stenosis. Suggestion of abnormal flow also in the LVA. Plan: She is on single antiplatelet currently with aspirin. Discussed with Dr. Pretty Sol. There is petechial hemorrhage noted so will hold adding second antiplatelet for now. Continue atorvastatin 80 mg and she will need improved control and adherence to her diabetes regimen and hypertensive medications. Continue therapy services. She will need help at home after discharge due to her cognitive and language dysfunction. PLEASE NOTE:   This document has been produced using voice recognition software. Unrecognized errors in transcription may be present.      Above reviewed,edited where necessary and I agree. The patient was independently examined by this examiner.   Linda Liu M.D.

## 2019-12-18 VITALS
HEART RATE: 77 BPM | DIASTOLIC BLOOD PRESSURE: 75 MMHG | OXYGEN SATURATION: 100 % | HEIGHT: 64 IN | TEMPERATURE: 98.6 F | SYSTOLIC BLOOD PRESSURE: 129 MMHG | WEIGHT: 160 LBS | RESPIRATION RATE: 18 BRPM | BODY MASS INDEX: 27.31 KG/M2

## 2019-12-18 LAB
ANION GAP SERPL CALC-SCNC: 6 MMOL/L (ref 3–18)
BUN SERPL-MCNC: 12 MG/DL (ref 7–18)
BUN/CREAT SERPL: 14 (ref 12–20)
CALCIUM SERPL-MCNC: 9 MG/DL (ref 8.5–10.1)
CHLORIDE SERPL-SCNC: 106 MMOL/L (ref 100–111)
CO2 SERPL-SCNC: 26 MMOL/L (ref 21–32)
CREAT SERPL-MCNC: 0.83 MG/DL (ref 0.6–1.3)
GLUCOSE BLD STRIP.AUTO-MCNC: 192 MG/DL (ref 70–110)
GLUCOSE BLD STRIP.AUTO-MCNC: 205 MG/DL (ref 70–110)
GLUCOSE SERPL-MCNC: 163 MG/DL (ref 74–99)
POTASSIUM SERPL-SCNC: 4 MMOL/L (ref 3.5–5.5)
SODIUM SERPL-SCNC: 138 MMOL/L (ref 136–145)

## 2019-12-18 PROCEDURE — 74011250637 HC RX REV CODE- 250/637: Performed by: NURSE PRACTITIONER

## 2019-12-18 PROCEDURE — 74011250636 HC RX REV CODE- 250/636: Performed by: NURSE PRACTITIONER

## 2019-12-18 PROCEDURE — 74011250636 HC RX REV CODE- 250/636: Performed by: INTERNAL MEDICINE

## 2019-12-18 PROCEDURE — 90471 IMMUNIZATION ADMIN: CPT

## 2019-12-18 PROCEDURE — 36415 COLL VENOUS BLD VENIPUNCTURE: CPT

## 2019-12-18 PROCEDURE — 74011250637 HC RX REV CODE- 250/637: Performed by: INTERNAL MEDICINE

## 2019-12-18 PROCEDURE — 90686 IIV4 VACC NO PRSV 0.5 ML IM: CPT | Performed by: INTERNAL MEDICINE

## 2019-12-18 PROCEDURE — 80048 BASIC METABOLIC PNL TOTAL CA: CPT

## 2019-12-18 PROCEDURE — 82962 GLUCOSE BLOOD TEST: CPT

## 2019-12-18 PROCEDURE — 74011636637 HC RX REV CODE- 636/637: Performed by: NURSE PRACTITIONER

## 2019-12-18 RX ORDER — INSULIN GLARGINE 100 [IU]/ML
14 INJECTION, SOLUTION SUBCUTANEOUS
Qty: 1 VIAL | Refills: 0 | Status: SHIPPED | OUTPATIENT
Start: 2019-12-18

## 2019-12-18 RX ORDER — LISINOPRIL 10 MG/1
10 TABLET ORAL DAILY
Qty: 30 TAB | Refills: 0 | Status: SHIPPED | OUTPATIENT
Start: 2019-12-18 | End: 2019-12-18 | Stop reason: SDUPTHER

## 2019-12-18 RX ORDER — INSULIN GLARGINE 100 [IU]/ML
14 INJECTION, SOLUTION SUBCUTANEOUS
Qty: 1 VIAL | Refills: 0 | Status: SHIPPED | OUTPATIENT
Start: 2019-12-18 | End: 2019-12-18 | Stop reason: SDUPTHER

## 2019-12-18 RX ORDER — INSULIN LISPRO 100 [IU]/ML
INJECTION, SOLUTION INTRAVENOUS; SUBCUTANEOUS
Qty: 1 VIAL | Refills: 0 | Status: SHIPPED | OUTPATIENT
Start: 2019-12-18

## 2019-12-18 RX ORDER — ASPIRIN 81 MG/1
81 TABLET ORAL DAILY
Qty: 30 TAB | Refills: 0 | Status: SHIPPED | OUTPATIENT
Start: 2019-12-18

## 2019-12-18 RX ORDER — ASPIRIN 81 MG/1
81 TABLET ORAL DAILY
Qty: 30 TAB | Refills: 0 | Status: SHIPPED | OUTPATIENT
Start: 2019-12-18 | End: 2019-12-18 | Stop reason: SDUPTHER

## 2019-12-18 RX ORDER — AMLODIPINE BESYLATE 5 MG/1
5 TABLET ORAL DAILY
Qty: 30 TAB | Refills: 0 | Status: SHIPPED | OUTPATIENT
Start: 2019-12-18

## 2019-12-18 RX ORDER — ATORVASTATIN CALCIUM 80 MG/1
80 TABLET, FILM COATED ORAL
Qty: 30 TAB | Refills: 0 | Status: SHIPPED | OUTPATIENT
Start: 2019-12-18 | End: 2019-12-18 | Stop reason: SDUPTHER

## 2019-12-18 RX ORDER — INSULIN LISPRO 100 [IU]/ML
INJECTION, SOLUTION INTRAVENOUS; SUBCUTANEOUS
Qty: 1 VIAL | Refills: 0 | Status: SHIPPED | OUTPATIENT
Start: 2019-12-18 | End: 2019-12-18 | Stop reason: SDUPTHER

## 2019-12-18 RX ORDER — ATORVASTATIN CALCIUM 80 MG/1
80 TABLET, FILM COATED ORAL
Qty: 30 TAB | Refills: 0 | Status: SHIPPED | OUTPATIENT
Start: 2019-12-18 | End: 2020-01-27 | Stop reason: SDUPTHER

## 2019-12-18 RX ORDER — AMLODIPINE BESYLATE 5 MG/1
5 TABLET ORAL DAILY
Qty: 30 TAB | Refills: 0 | Status: SHIPPED | OUTPATIENT
Start: 2019-12-18 | End: 2019-12-18 | Stop reason: SDUPTHER

## 2019-12-18 RX ORDER — LISINOPRIL 10 MG/1
10 TABLET ORAL DAILY
Qty: 30 TAB | Refills: 0 | Status: SHIPPED | OUTPATIENT
Start: 2019-12-18

## 2019-12-18 RX ADMIN — INFLUENZA VIRUS VACCINE 0.5 ML: 15; 15; 15; 15 SUSPENSION INTRAMUSCULAR at 12:34

## 2019-12-18 RX ADMIN — LISINOPRIL 10 MG: 10 TABLET ORAL at 09:10

## 2019-12-18 RX ADMIN — AMLODIPINE BESYLATE 5 MG: 5 TABLET ORAL at 09:10

## 2019-12-18 RX ADMIN — PANTOPRAZOLE SODIUM 40 MG: 40 TABLET, DELAYED RELEASE ORAL at 07:50

## 2019-12-18 RX ADMIN — HEPARIN SODIUM 5000 UNITS: 5000 INJECTION INTRAVENOUS; SUBCUTANEOUS at 05:18

## 2019-12-18 RX ADMIN — INSULIN LISPRO 2 UNITS: 100 INJECTION, SOLUTION INTRAVENOUS; SUBCUTANEOUS at 07:50

## 2019-12-18 RX ADMIN — ASPIRIN 81 MG: 81 TABLET, COATED ORAL at 09:10

## 2019-12-18 RX ADMIN — INSULIN LISPRO 4 UNITS: 100 INJECTION, SOLUTION INTRAVENOUS; SUBCUTANEOUS at 11:22

## 2019-12-18 NOTE — ROUTINE PROCESS
As part of the discharge instructions, medications already given today were discussed with the patient and daughter. The next dose due of all ordered meds was highlighted as part of the medication discharge instructions. Discussed with the patient and daughter the importance of taking medications as directed, as well as the side effects and adverse reactions to medications ordered. Acknowledged understanding. Discharged to car via wheelchair.

## 2019-12-18 NOTE — DISCHARGE SUMMARY
[facility]  [facility]charge Summary       Patient: Azael Mathews Age: 78 y.o. : 1950 MR#: [id number] SSN: [id number]  PCP on record: None  Admit date: 12/15/2019  Discharge date: 2019    Disposition:    []Home   [x]Home with Home Health   []SNF/NH   []Rehab   []Home with family   []Alternate Facility:____________________    Admission Diagnoses:  CVA (cerebral vascular accident) ([facility] [I63.9]  Hyperglycemia [R73.9]    Discharge Diagnoses:                             1.  Intermittent confusion, due to #2     2.  Multifocal acute infarcts involving right basal ganglia, left corona radiata to centrum semiovale   3.  Diabetes mellitus with hyperglycemia   4.  Hypertension  5.  Dyslipidemia  6.  Medical noncompliance   7. Hypokalemia     Discharge Medications:     Current Discharge Medication List      START taking these medications    Details   insulin lispro (HUMALOG) 100 unit/mL injection Use for blood sugar coverage before meals and at bedtime, administer subcutaneously    Less than 150 no additional insulin, 150 -199 give 2 units, 200 -249 give 4 units, 250 -299 give 6 units, 300 -349 give  8 units, 350 and above give 10 units  Qty: 1 Vial, Refills: 0         CONTINUE these medications which have CHANGED    Details   aspirin delayed-release (ECOTRIN LOW STRENGTH) 81 mg tablet Take 1 Tab by mouth daily. Qty: 30 Tab, Refills: 0      amLODIPine (NORVASC) 5 mg tablet Take 1 Tab by mouth daily. Qty: 30 Tab, Refills: 0      atorvastatin (LIPITOR) 80 mg tablet Take 1 Tab by mouth nightly. Qty: 30 Tab, Refills: 0      insulin glargine (LANTUS U-100 INSULIN) 100 unit/mL injection 14 Units by SubCUTAneous route nightly. Qty: 1 Vial, Refills: 0      lisinopril (PRINIVIL, ZESTRIL) 10 mg tablet Take 1 Tab by mouth daily.   Qty: 30 Tab, Refills: 0         CONTINUE these medications which have NOT CHANGED    Details   Omeprazole delayed release (PRILOSEC D/R) 20 mg tablet Take 20 mg by mouth daily. STOP taking these medications       chlorthalidone (HYGROTEN) 25 mg tablet Comments:   Reason for Stopping:         glipiZIDE (GLUCOTROL) 5 mg tablet Comments:   Reason for Stopping:             Consults:    - Neurology     Procedures:  -   None    Significant Diagnostic Studies:   -  Mra Brain Wo Cont    Result Date: 12/17/2019  IMPRESSION: 1. Significantly motion compromised exam. \"Appearance\" of extensive high-grade anterior circulation stenoses, likely combination of artifactual and \"real\" -Flow limitation only specifically suggested at superior M2 division LMCA 2. At the posterior circulation, suggested abnormal flow (diminished or reversed) nondominant LVA -No flow limitation suggested at other arteries     Mra Neck Wo Cont    Result Date: 12/17/2019  IMPRESSION: 1. Multiple arterial segments are not effectively evaluated 2. Carotid bulbs mildly stenotic 3. Dominant RVA has in-line flow 4. Small and nondominant LVA may have slow flow or reversed flow and may be stenotic/occluded. . Thank you for this referral.     Mri Brain Wo Cont    Result Date: 12/17/2019  IMPRESSION: 1. Several foci of acute ischemic infarcts in the left corona radiata to centrum semiovale. 2. Right basal ganglia subacute infarct with evidence of petechial hemorrhage. Ct Head Wo Cont    Result Date: 12/16/2019  IMPRESSION: Multifocal age indeterminant but potentially acute/subacute infarcts in the right basal ganglia, left corona radiata to centrum semiovale. Small hyperdensities around the right basal ganglia infarct may represent petechial hemorrhage or calcification. Overall no significant interval change when compared to 12/15/2019 although these are new since 2/3/2018. Ct Head Wo Cont    Result Date: 12/15/2019  IMPRESSION: There are 2 age indeterminant hypodense small subcortical infarcts high left frontal lobe and a single age indeterminant right basal ganglia.  No hemorrhage or mass effect, no shift or hydrocephalus. Several chronic left hemispheric infarcts as detailed above. Xr Chest Port    Result Date: 12/16/2019   IMPRESSION: 1. No acute cardiopulmonary process. ECHO ADULT COMPLETE W BUBBLE STUDY on 78/94/6695  · Normal systolic function (ejection fraction normal). Small left ventricle. Mild concentric hypertrophy. Estimated left ventricular ejection fraction is 60 - 65%. No regional wall motion abnormality noted. Age-appropriate left ventricular diastolic function. · Agitated saline contrast study was performed and color flow Doppler was used. Multiple injections performed with and without Valsalva maneuver. No shunt seen. Hospital Course by Problem   Patient presented to hospital per H&P with \"intermittent confusion, right hand numbness and slurring of speech since Thursday\"  1.  Intermittent confusion, due to #2 - at time of admission in setting of acute CVA. Much improved / at baseline at time of discharge. 2.  Multifocal acute infarcts involving right basal ganglia, left corona radiata to centrum semiovale - in setting of medical non-compliance and poorly controlled diabetes. Neurology following during admission and recommended to continue aspirin only in light of petechial hemorrhage. Discussed with patient and daughter need for medication compliance / control of DM, hyperlipidemia and HTN. 3.  Diabetes mellitus with hyperglycemia - very poorly controlled with A1c of 14.1 at time of admission. Non compliant with insulin and diabetic monitoring at home. Diabetes educator following with discussion of importance of diabetic diet and medication compliance / blood sugar monitoring and instruction of insulin administration with patient and daughter prior to discharge. 4.  Hypertension - controlled, continue norvasc / lisinopril during admission and upon discharge. 5.  Dyslipidemia - , started on high dose statin during admission.   ALT/AST normal at 19/14 during admission. 6.  Medical noncompliance - discussed need for  supervision, including for medication management / monitoring with daughter shahrzad during admission. [facility] requested. 7.  Hypokalemia - mild hypokalemia at 3.2 during admission, replaced and at 4.0 prior to discharge. Today's examination of the patient revealed:     Subjective:   Denies weakness, SOB, CP, n/v or abd pain.     Objective:   VS:   Visit Vitals  /55 (BP 1 Location: Left arm, BP Patient Position: At rest)   Pulse 70   Temp 98.6 °F (37 °C)   Resp 18   Ht 5' 4\" (1.626 m)   Wt 72.6 kg (160 lb)   SpO2 98%   Breastfeeding No   BMI 27.46 kg/m²      Tmax/24hrs: Temp (24hrs), Av.5 °F (36.9 °C), Min:98.3 °F (36.8 °C), Max:98.6 °F (37 °C)     Input/Output:     Intake/Output Summary (Last 24 hours) at 2019 0852  Last data filed at 2019 0757  Gross per 24 hour   Intake    Output 700 ml   Net -700 ml     General:  Alert, NAD  Cardiovascular:  RRR  Pulmonary:  LSC throughout  GI:  +BS in all four quadrants, soft, non-tender  Extremities:  No edema; 2+ dorsalis pedis pulses bilaterally  Neuro: alert and oriented x 3; mild 4/5 RUE weakness; otherwise 5/5 throughout    Labs:    Recent Results (from the past 24 hour(s))   ECHO ADULT COMPLETE    Collection Time: 19 10:04 AM   Result Value Ref Range    LA Volume 30.78 22 - 52 mL    Ao Root D 2.99 cm    LVIDd 3.39 (A) 3.9 - 5.3 cm    LVPWd 1.19 (A) 0.6 - 0.9 cm    LVIDs 2.25 cm    IVSd 1.14 (A) 0.6 - 0.9 cm    LVOT d 1.72 cm    LVOT Peak Velocity 111.52 cm/s    LVOT Peak Gradient 5.0 mmHg    LVOT VTI 23.43 cm    MV A [place] 95.19 cm/s    MV E [place] 59.71 cm/s    MV E/A 0.63     LA Vol 4C 18.49 (A) 22 - 52 mL    LA Vol 2C 42.24 22 - 52 mL    LA Area 4C 9.6 cm2    LV Mass .9 67 - 162 g    LV Mass AL Index 79.2 43 - 95 g/m2    Mitral Valve E Wave Deceleration Time 255.0 ms    LA Vol Index 17.30 16 - 28 ml/m2    LA Vol Index 23.74 16 - 28 ml/m2    LA Vol Index 10.39 16 - 28 ml/m2 Left Ventricular Fractional Shortening by 2D 89.475626204 %    Left Ventricular Outflow Tract Mean Gradient 0.2751091760 mmHg    Left Ventricular Outflow Tract Mean Velocity 4.1744888779 cm/s    Mitral Valve Deceleration [place] 7.369346346     Left Ventricular End Diastolic Volume by Teichholz Method 84.537912499 mL    Left Ventricular End Systolic Volume by Teichholz Method 6.0223078651 mL    Left Ventricular Stroke Volume by Teichholz Method 31.15258379 mL   GLUCOSE, POC    Collection Time: 12/17/19 11:18 AM   Result Value Ref Range    Glucose (POC) 317 (H) 70 - 110 mg/dL   GLUCOSE, POC    Collection Time: 12/17/19  3:42 PM   Result Value Ref Range    Glucose (POC) 231 (H) 70 - 110 mg/dL   GLUCOSE, POC    Collection Time: 12/17/19  9:45 PM   Result Value Ref Range    Glucose (POC) 235 (H) 70 - 225 mg/dL   METABOLIC PANEL, BASIC    Collection Time: 12/18/19  5:07 AM   Result Value Ref Range    Sodium 138 136 - 145 mmol/L    Potassium 4.0 3.5 - 5.5 mmol/L    Chloride 106 100 - 111 mmol/L    CO2 26 21 - 32 mmol/L    Anion gap 6 3.0 - 18 mmol/L    Glucose 163 (H) 74 - 99 mg/dL    BUN 12 7.0 - 18 MG/DL    Creatinine 0.83 0.6 - 1.3 MG/DL    BUN/Creatinine ratio 14 12 - 20      GFR est AA >60 >60 ml/min/1.73m2    GFR est non-AA >60 >60 ml/min/1.73m2    Calcium 9.0 8.5 - 10.1 MG/DL   GLUCOSE, POC    Collection Time: 12/18/19  6:44 AM   Result Value Ref Range    Glucose (POC) 192 (H) 70 - 110 mg/dL     Additional Data Reviewed:     Condition: Stable  Follow-up Appointments:   PCP in 5-7 days Neurology Dr. Ryan Johnson in 3-4 weeks    >30 minutes spent coordinating this discharge (review instructions/follow-up, prescriptions, preparing report for sign off)    Signed:  Gagandeep Zhao MD  8:52 AM

## 2019-12-18 NOTE — ROUTINE PROCESS
Bedside and Verbal shift change report given to RICH Adams  by Connie Mcdonald. NIK Mac . Report included the following information SBAR, Kardex, Intake/Output, MAR and Recent Results.

## 2019-12-18 NOTE — PROGRESS NOTES
The Plan for Transition of Care is related to the following treatment goals:  Home health    The Patient and/or patient representative  was provided with a choice of provider and agrees   with the discharge plan. [x] Yes [] No    Freedom of choice list was provided with basic dialogue that supports the patient's individualized plan of care/goals, treatment preferences and shares the quality data associated with the providers. [x] Yes [] No  Patient refused to use the [place] home health. Discharge order noted for today. Pt has been accepted to [address] [place] [address]. Met with patient and are agreeable to the transition plan today. Transport has been arranged through pt's daughter Patient's discharge summary and home health  [facility] via [place]. Updated bedside RN, Palak Davis  to the transition plan. Discharge information has been documented on the AVS.   [facility] declined home health care because pt's pcp is with the [facility] order sent to the [place].   VINNIE Adan RN  Care Management  Pager: [phone]

## 2019-12-18 NOTE — PROGRESS NOTES
Problem: Falls - Risk of  Goal: *Absence of Falls  Description  Document Soraida Dear Fall Risk and appropriate interventions in the flowsheet.   Outcome: Progressing Towards Goal  Note: Fall Risk Interventions:       Mentation Interventions: Door open when patient unattended    Medication Interventions: Patient to call before getting OOB, Teach patient to arise slowly    Elimination Interventions: Call light in reach

## 2019-12-18 NOTE — DISCHARGE INSTRUCTIONS
Patient armband removed and shredded  Mirta Dumont Zahida from Nurse    What to do at Home:  Recommended activity: Activity as tolerated,     If you experience any of the following symptoms B.E.F.A.S. T, please follow up with nearest emergency room or primary care physician. *  Please give a list of your current medications to your Primary Care Provider. *  Please update this list whenever your medications are discontinued, doses are      changed, or new medications (including over-the-counter products) are added. *  Please carry medication information at all times in case of emergency situations. These are general instructions for a healthy lifestyle:    No smoking/ No tobacco products/ Avoid exposure to second hand smoke  Surgeon General's Warning:  Quitting smoking now greatly reduces serious risk to your health. Obesity, smoking, and sedentary lifestyle greatly increases your risk for illness    A healthy diet, regular physical exercise & weight monitoring are important for maintaining a healthy lifestyle    You may be retaining fluid if you have a history of heart failure or if you experience any of the following symptoms:  Weight gain of 3 pounds or more overnight or 5 pounds in a week, increased swelling in our hands or feet or shortness of breath while lying flat in bed. Please call your doctor as soon as you notice any of these symptoms; do not wait until your next office visit. The discharge information has been reviewed with the patient and caregiver. The patient and caregiver verbalized understanding. Discharge medications reviewed with the patient and caregiver and appropriate educational materials and side effects teaching were provided.   ___________________________________________________________________________________________________________________________________

## 2019-12-18 NOTE — ROUTINE PROCESS
1915 Bedside and Verbal shift change  Received from Jack Flores RN (outgoing nurse), to RICH Pedro (oncoming)  Pt. Is AOX 3. IV SL, Pt. denies  pain at this time. Report included the following information SBAR, Kardex, Procedure Summary, Intake/Output, MAR, Recent Lab Results, and  Cardiac Rhythm @ NSR. Will resume care and monitor Pt. Condition. Pt. Educated on call bell when in need of help and assistance. Pt. verbalized understanding. 2000 Pt. Head to toe Assessment Done and documented. 2130  Pt. Denies pain. [address] to bathroom independently. 0000  Pt. Resting in bed comfortably, eyes closed easily awaken. 0200  Pt. Made no complaints. 0400  Pt. Resting well not in distress. 0600  Pt. Able to rest and sleep well throughout the shift. Verbal and bedside Shift changed report given to Jack Flores RN (oncoming RN) on Pt. Condition. Report consisted of patients Situation, History, Activities, intake/output,  Background, Assessment and Recommendations(SBAR). Information from the following report(s) Kardex, order Summary, Lab results and MAR was reviewed with the receiving nurse. Opportunity for questions and clarification was provided.

## 2019-12-19 ENCOUNTER — OFFICE VISIT (OUTPATIENT)
Dept: NEUROLOGY | Age: 69
End: 2019-12-19

## 2019-12-19 VITALS
HEIGHT: 64 IN | BODY MASS INDEX: 27.42 KG/M2 | TEMPERATURE: 98.6 F | OXYGEN SATURATION: 97 % | SYSTOLIC BLOOD PRESSURE: 90 MMHG | DIASTOLIC BLOOD PRESSURE: 60 MMHG | HEART RATE: 86 BPM | WEIGHT: 160.6 LBS | RESPIRATION RATE: 20 BRPM

## 2019-12-19 DIAGNOSIS — I63.512 CEREBROVASCULAR ACCIDENT (CVA) DUE TO OCCLUSION OF LEFT MIDDLE CEREBRAL ARTERY (HCC): Primary | ICD-10-CM

## 2019-12-19 NOTE — PROGRESS NOTES
Re:  Saad Linares up visit     12/19/2019 11:48 AM    SSN: [id number]    Subjective:   Lala Soto is seen in follow-up. She's here with her daughter today. She has a primary doctor she sees at the [place]. Medications:    Current Outpatient Medications   Medication Sig Dispense Refill    lisinopril (PRINIVIL, ZESTRIL) 10 mg tablet Take 1 Tab by mouth daily. 30 Tab 0    aspirin delayed-release (ECOTRIN LOW STRENGTH) 81 mg tablet Take 1 Tab by mouth daily. 30 Tab 0    atorvastatin (LIPITOR) 80 mg tablet Take 1 Tab by mouth nightly. 30 Tab 0    insulin glargine (LANTUS U-100 INSULIN) 100 unit/mL injection 14 Units by SubCUTAneous route nightly. 1 Vial 0    insulin lispro (HUMALOG) 100 unit/mL injection Use for blood sugar coverage before meals and at bedtime, administer subcutaneously    Less than 150 no additional insulin, 150 -199 give 2 units, 200 -249 give 4 units, 250 -299 give 6 units, 300 -349 give  8 units, 350 and above give 10 units 1 Vial 0    Omeprazole delayed release (PRILOSEC D/R) 20 mg tablet Take 20 mg by mouth daily.  amLODIPine (NORVASC) 5 mg tablet Take 1 Tab by mouth daily.  30 Tab 0       Vital signs:    Visit Vitals  BP 90/60 (BP 1 Location: Left arm, BP Patient Position: Sitting)   Pulse 86   Temp 98.6 °F (37 °C) (Oral)   Resp 20   Ht 5' 4\" (1.626 m)   Wt 72.8 kg (160 lb 9.6 oz)   SpO2 97%   BMI 27.57 kg/m²       Review of Systems:   As above otherwise 11 point review of systems negative including:  Constitutional no fever or chills  Skin denies rash or itching  HEENT  Denies tinnitus, hearing loss  Eyes denies diplopia or vision loss  Respiratory denies shortness of breath  Cardiovascular denies chest pain, dyspnea on exertion  Gastrointestinal denies nausea, vomiting, diarrhea, constipation  Genitourinary denies incontinence  Musculoskeletal denies joint pain or swelling  Endocrine denies weight change  Hematology denies easy bruising or bleeding Neurological as above in HPI      Patient Active Problem List   Diagnosis Code    Hyperglycemia R73.9    [facility] I[facility]    Diabetes [facility] E11.65         Objective: The patient is awake, alert, and oriented x 1. States month as November. Fund of knowledge is inadequate. Naming intact of simple items. She has dysphasia. Cranial Nerves: II  Visual fields are full to confrontation. III, IV, VI  Extraocular movements are intact. There is no nystagmus. V  Facial sensation is intact to pinprick. VII  Face is symmetrical.  VIII - Hearing is present. IX, X, XII  Palate is symmetrical.   XI - Shoulder shrugging and head turning intact  Motor:  Mild RUE weakness, about 4/5 at delt and 4+/5 elsewhere. Mild RUE pronation but minimal drift. Tone is normal. Reflexes are trace and symmetrical. Plantars are down going. Gait is wide based, mildly right hemiparetic. CBC:   Lab Results   Component Value Date/Time    WBC 5.6 12/17/2019 03:58 AM    RBC 4.75 12/17/2019 03:58 AM    HGB 13.0 12/17/2019 03:58 AM    HCT 38.7 12/17/2019 03:58 AM    PLATELET 745 86/58/7634 03:58 AM     BMP:   Lab Results   Component Value Date/Time    Glucose 163 (H) 12/18/2019 05:07 AM    Sodium 138 12/18/2019 05:07 AM    Potassium 4.0 12/18/2019 05:07 AM    Chloride 106 12/18/2019 05:07 AM    CO2 26 12/18/2019 05:07 AM    BUN 12 12/18/2019 05:07 AM    Creatinine 0.83 12/18/2019 05:07 AM    Calcium 9.0 12/18/2019 05:07 AM     CMP:   Lab Results   Component Value Date/Time    Glucose 163 (H) 12/18/2019 05:07 AM    Sodium 138 12/18/2019 05:07 AM    Potassium 4.0 12/18/2019 05:07 AM    Chloride 106 12/18/2019 05:07 AM    CO2 26 12/18/2019 05:07 AM    BUN 12 12/18/2019 05:07 AM    Creatinine 0.83 12/18/2019 05:07 AM    Calcium 9.0 12/18/2019 05:07 AM    Anion gap 6 12/18/2019 05:07 AM    BUN/Creatinine ratio 14 12/18/2019 05:07 AM    Alk.  phosphatase 135 (H) 12/15/2019 09:55 PM Protein, total 7.6 12/15/2019 09:55 PM    Albumin 3.1 (L) 12/15/2019 09:55 PM    Globulin 4.5 (H) 12/15/2019 09:55 PM    A-G Ratio 0.7 (L) 12/15/2019 09:55 PM     Coagulation:   Lab Results   Component Value Date/Time    Prothrombin time 14.1 12/15/2019 09:55 PM    INR 1.1 12/15/2019 09:55 PM    aPTT 28.9 12/15/2019 09:55 PM     Cardiac markers:   Lab Results   Component Value Date/Time    CK 61 12/15/2019 09:55 PM    CK-MB Index  12/15/2019 09:55 PM     CALCULATION NOT PERFORMED WHEN RESULT IS BELOW LINEAR LIMIT     ABGs: No results found for: PH, PO2, PCO2, HCO3, BD, BE  Radiology review:       Assessment: This is a 58-year-old left-handed female with a left corona radiata to centrum semiovale AIS and right basal ganglia subacute infarct with petechial hemorrhage who has risk factors including hypertension, dyslipidemia, and poorly controlled diabetes. MRA was limited due to motion but reported the appearance of extensive high-grade anterior circulation stenoses, thought to be a combination of artifactual and real stenosis. Suggestion of abnormal flow also in the LVA. Plan: She is on single antiplatelet currently with aspirin. There is petechial hemorrhage noted so will hold adding second antiplatelet for now. Continue atorvastatin 80 mg and she will need improved control and adherence to her diabetes regimen and hypertensive medications. Continue therapy services. She needs speech and occupational therapy.

## 2020-01-27 ENCOUNTER — OFFICE VISIT (OUTPATIENT)
Dept: NEUROLOGY | Age: 70
End: 2020-01-27

## 2020-01-27 VITALS
BODY MASS INDEX: 26.91 KG/M2 | DIASTOLIC BLOOD PRESSURE: 84 MMHG | OXYGEN SATURATION: 98 % | WEIGHT: 157.6 LBS | HEART RATE: 71 BPM | RESPIRATION RATE: 20 BRPM | TEMPERATURE: 97.8 F | SYSTOLIC BLOOD PRESSURE: 124 MMHG | HEIGHT: 64 IN

## 2020-01-27 DIAGNOSIS — I63.512 CEREBROVASCULAR ACCIDENT (CVA) DUE TO OCCLUSION OF LEFT MIDDLE CEREBRAL ARTERY (HCC): Primary | ICD-10-CM

## 2020-01-27 RX ORDER — ATORVASTATIN CALCIUM 80 MG/1
80 TABLET, FILM COATED ORAL
Qty: 30 TAB | Refills: 3 | Status: SHIPPED | OUTPATIENT
Start: 2020-01-27

## 2020-01-27 NOTE — PROGRESS NOTES
Re:  Paty Bare up visit     1/27/2020 9:38 AM    SSN: [id number]    Subjective:   Amaya Ulloa is seen in follow-up. She's here with her mother today. She has a primary doctor she sees at the [place]. Medications:    Current Outpatient Medications   Medication Sig Dispense Refill    amLODIPine (NORVASC) 5 mg tablet Take 1 Tab by mouth daily. 30 Tab 0    lisinopril (PRINIVIL, ZESTRIL) 10 mg tablet Take 1 Tab by mouth daily. 30 Tab 0    aspirin delayed-release (ECOTRIN LOW STRENGTH) 81 mg tablet Take 1 Tab by mouth daily. 30 Tab 0    atorvastatin (LIPITOR) 80 mg tablet Take 1 Tab by mouth nightly. 30 Tab 0    insulin glargine (LANTUS U-100 INSULIN) 100 unit/mL injection 14 Units by SubCUTAneous route nightly. 1 Vial 0    insulin lispro (HUMALOG) 100 unit/mL injection Use for blood sugar coverage before meals and at bedtime, administer subcutaneously    Less than 150 no additional insulin, 150 -199 give 2 units, 200 -249 give 4 units, 250 -299 give 6 units, 300 -349 give  8 units, 350 and above give 10 units 1 Vial 0    Omeprazole delayed release (PRILOSEC D/R) 20 mg tablet Take 20 mg by mouth daily.          Vital signs:    Visit Vitals  /84 (BP 1 Location: Left arm, BP Patient Position: Sitting)   Pulse 71   Temp 97.8 °F (36.6 °C) (Oral)   Resp 20   Ht 5' 4\" (1.626 m)   Wt 71.5 kg (157 lb 9.6 oz)   SpO2 98%   BMI 27.05 kg/m²       Review of Systems:   As above otherwise 11 point review of systems negative including:  Constitutional no fever or chills  Skin denies rash or itching  HEENT  Denies tinnitus, hearing loss  Eyes denies diplopia or vision loss  Respiratory denies shortness of breath  Cardiovascular denies chest pain, dyspnea on exertion  Gastrointestinal denies nausea, vomiting, diarrhea, constipation  Genitourinary denies incontinence  Musculoskeletal denies joint pain or swelling  Endocrine denies weight change  Hematology denies easy bruising or bleeding Neurological as above in HPI      Patient Active Problem List   Diagnosis Code    Hyperglycemia R73.9    [facility] I63.9    Diabetes mellitus with hyperglycemia ([facility] E11.65         Objective: The patient is awake, alert, and oriented x 4. Fund of knowledge is adequate. Naming intact at this time. She has dysphasia, but much improved. Cranial Nerves: II  Visual fields are full to confrontation. III, IV, VI  Extraocular movements are intact. There is no nystagmus. V  Facial sensation is intact to pinprick. VII  Face is symmetrical.  VIII - Hearing is present. IX, X, XII  Palate is symmetrical.   XI - Shoulder shrugging and head turning intact  Motor:  Mild RUE weakness, about 4+/5 in an upper motor neuron distribution. Mild RUE pronation but minimal drift. Tone is normal. Reflexes are trace and symmetrical. Plantars are down going. Gait is wide based, mildly right hemiparetic. CBC:   Lab Results   Component Value Date/Time    WBC 5.6 12/17/2019 03:58 AM    RBC 4.75 12/17/2019 03:58 AM    HGB 13.0 12/17/2019 03:58 AM    HCT 38.7 12/17/2019 03:58 AM    PLATELET 252 82/98/6975 03:58 AM     BMP:   Lab Results   Component Value Date/Time    Glucose 163 (H) 12/18/2019 05:07 AM    Sodium 138 12/18/2019 05:07 AM    Potassium 4.0 12/18/2019 05:07 AM    Chloride 106 12/18/2019 05:07 AM    CO2 26 12/18/2019 05:07 AM    BUN 12 12/18/2019 05:07 AM    Creatinine 0.83 12/18/2019 05:07 AM    Calcium 9.0 12/18/2019 05:07 AM     CMP:   Lab Results   Component Value Date/Time    Glucose 163 (H) 12/18/2019 05:07 AM    Sodium 138 12/18/2019 05:07 AM    Potassium 4.0 12/18/2019 05:07 AM    Chloride 106 12/18/2019 05:07 AM    CO2 26 12/18/2019 05:07 AM    BUN 12 12/18/2019 05:07 AM    Creatinine 0.83 12/18/2019 05:07 AM    Calcium 9.0 12/18/2019 05:07 AM    Anion gap 6 12/18/2019 05:07 AM    BUN/Creatinine ratio 14 12/18/2019 05:07 AM    Alk.  phosphatase 135 (H) 12/15/2019 09:55 PM Protein, total 7.6 12/15/2019 09:55 PM    Albumin 3.1 (L) 12/15/2019 09:55 PM    Globulin 4.5 (H) 12/15/2019 09:55 PM    A-G Ratio 0.7 (L) 12/15/2019 09:55 PM     Coagulation:   Lab Results   Component Value Date/Time    Prothrombin time 14.1 12/15/2019 09:55 PM    INR 1.1 12/15/2019 09:55 PM    aPTT 28.9 12/15/2019 09:55 PM     Cardiac markers:   Lab Results   Component Value Date/Time    CK 61 12/15/2019 09:55 PM    CK-MB Index  12/15/2019 09:55 PM     CALCULATION NOT PERFORMED WHEN RESULT IS BELOW LINEAR LIMIT     ABGs: No results found for: PH, PO2, PCO2, HCO3, BD, BE  Radiology review:       Assessment: This is a 79-year-old left-handed female with a left corona radiata to centrum semiovale AIS and right basal ganglia subacute infarct with petechial hemorrhage who has risk factors including hypertension, dyslipidemia, and poorly controlled diabetes. MRA was limited due to motion but reported the appearance of extensive high-grade anterior circulation stenoses, thought to be a combination of artifactual and real stenosis. Suggestion of abnormal flow also in the LVA. Plan: She is on single antiplatelet currently with aspirin. There is petechial hemorrhage noted so will hold adding second antiplatelet for now. Continue atorvastatin 80 mg and she will need improved control and adherence to her diabetes regimen and hypertensive medications. Continue therapy services. She needs speech and occupational therapy.

## 2020-04-28 ENCOUNTER — VIRTUAL VISIT (OUTPATIENT)
Dept: NEUROLOGY | Age: 70
End: 2020-04-28

## 2020-04-28 VITALS — WEIGHT: 157 LBS | HEIGHT: 64 IN | BODY MASS INDEX: 26.8 KG/M2

## 2020-04-28 RX ORDER — PIOGLITAZONEHYDROCHLORIDE 30 MG/1
TABLET ORAL DAILY
COMMUNITY

## 2020-04-28 NOTE — PROGRESS NOTES
Eran Holman is a 78 y.o. female on virtual visit for follow-up on CVA. 1. Have you been to the ER, urgent care clinic since your last visit? Hospitalized since your last visit? No    2. Have you seen or consulted any other health care providers outside of the [address] [place] [address] since your last visit? Include any pap smears or colon screening.  No

## 2020-06-12 ENCOUNTER — OFFICE VISIT (OUTPATIENT)
Dept: NEUROLOGY | Age: 70
End: 2020-06-12

## 2020-06-12 VITALS — WEIGHT: 161 LBS | BODY MASS INDEX: 27.49 KG/M2 | HEIGHT: 64 IN

## 2020-06-12 DIAGNOSIS — I63.512 CEREBROVASCULAR ACCIDENT (CVA) DUE TO OCCLUSION OF LEFT MIDDLE CEREBRAL ARTERY (HCC): Primary | ICD-10-CM

## 2020-06-12 RX ORDER — ALOGLIPTIN 25 MG/1
25 TABLET, FILM COATED ORAL DAILY
COMMUNITY

## 2020-06-12 NOTE — PROGRESS NOTES
Re:  Elizabeth Krueger up visit     6/12/2020 9:38 AM    SSN: [id number]    Subjective:   Ulisses Carson is seen in follow-up. She was getting therapy at the [place], but that's on hold with the COVID crisis. She has a primary doctor she sees at the [place]. Medications:    Current Outpatient Medications   Medication Sig Dispense Refill    empagliflozin (JARDIANCE) 25 mg tablet Take  by mouth daily.  alogliptin (NESINA) 25 mg tablet Take 25 mg by mouth daily.  pioglitazone (ACTOS) 30 mg tablet Take  by mouth daily.  atorvastatin (LIPITOR) 80 mg tablet Take 1 Tab by mouth nightly. 30 Tab 3    amLODIPine (NORVASC) 5 mg tablet Take 1 Tab by mouth daily. 30 Tab 0    lisinopril (PRINIVIL, ZESTRIL) 10 mg tablet Take 1 Tab by mouth daily. 30 Tab 0    aspirin delayed-release (ECOTRIN LOW STRENGTH) 81 mg tablet Take 1 Tab by mouth daily. 30 Tab 0    Omeprazole delayed release (PRILOSEC D/R) 20 mg tablet Take 20 mg by mouth daily.  insulin glargine (LANTUS U-100 INSULIN) 100 unit/mL injection 14 Units by SubCUTAneous route nightly.  1 Vial 0    insulin lispro (HUMALOG) 100 unit/mL injection Use for blood sugar coverage before meals and at bedtime, administer subcutaneously    Less than 150 no additional insulin, 150 -199 give 2 units, 200 -249 give 4 units, 250 -299 give 6 units, 300 -349 give  8 units, 350 and above give 10 units 1 Vial 0       Vital signs:    Visit Vitals  Ht 5' 4\" (1.626 m)   Wt 73 kg (161 lb)   BMI 27.64 kg/m²       Review of Systems:   As above otherwise 11 point review of systems negative including:  Constitutional no fever or chills  Skin denies rash or itching  HEENT  Denies tinnitus, hearing loss  Eyes denies diplopia or vision loss  Respiratory denies shortness of breath  Cardiovascular denies chest pain, dyspnea on exertion  Gastrointestinal denies nausea, vomiting, diarrhea, constipation  Genitourinary denies incontinence  Musculoskeletal denies joint pain or swelling  Endocrine denies weight change  Hematology denies easy bruising or bleeding   Neurological as above in HPI      Patient Active Problem List   Diagnosis Code    Hyperglycemia R73.9    [facility] I63.9    [facility] E11.65         Objective: The patient is awake, alert, and oriented x 4. Fund of knowledge is adequate. Naming intact at this time. She has dysphasia, but much improved. CBC:   Lab Results   Component Value Date/Time    WBC 5.6 12/17/2019 03:58 AM    RBC 4.75 12/17/2019 03:58 AM    HGB 13.0 12/17/2019 03:58 AM    HCT 38.7 12/17/2019 03:58 AM    PLATELET 442 32/68/4905 03:58 AM     BMP:   Lab Results   Component Value Date/Time    Glucose 163 (H) 12/18/2019 05:07 AM    Sodium 138 12/18/2019 05:07 AM    Potassium 4.0 12/18/2019 05:07 AM    Chloride 106 12/18/2019 05:07 AM    CO2 26 12/18/2019 05:07 AM    BUN 12 12/18/2019 05:07 AM    Creatinine 0.83 12/18/2019 05:07 AM    Calcium 9.0 12/18/2019 05:07 AM     CMP:   Lab Results   Component Value Date/Time    Glucose 163 (H) 12/18/2019 05:07 AM    Sodium 138 12/18/2019 05:07 AM    Potassium 4.0 12/18/2019 05:07 AM    Chloride 106 12/18/2019 05:07 AM    CO2 26 12/18/2019 05:07 AM    BUN 12 12/18/2019 05:07 AM    Creatinine 0.83 12/18/2019 05:07 AM    Calcium 9.0 12/18/2019 05:07 AM    Anion gap 6 12/18/2019 05:07 AM    BUN/Creatinine ratio 14 12/18/2019 05:07 AM    Alk.  phosphatase 135 (H) 12/15/2019 09:55 PM    Protein, total 7.6 12/15/2019 09:55 PM    Albumin 3.1 (L) 12/15/2019 09:55 PM    Globulin 4.5 (H) 12/15/2019 09:55 PM    A-G Ratio 0.7 (L) 12/15/2019 09:55 PM     Coagulation:   Lab Results   Component Value Date/Time    Prothrombin time 14.1 12/15/2019 09:55 PM    INR 1.1 12/15/2019 09:55 PM    aPTT 28.9 12/15/2019 09:55 PM     Cardiac markers:   Lab Results   Component Value Date/Time    CK 61 12/15/2019 09:55 PM    CK-MB Index  12/15/2019 09:55 PM     CALCULATION NOT PERFORMED WHEN RESULT IS BELOW LINEAR LIMIT     ABGs: No results found for: PH, PO2, PCO2, HCO3, BD, BE  Radiology review:       Assessment: This is a 24-year-old left-handed female with a left corona radiata to centrum semiovale AIS and right basal ganglia subacute infarct with petechial hemorrhage who has risk factors including hypertension, dyslipidemia, and poorly controlled diabetes. MRA was limited due to motion but reported the appearance of extensive high-grade anterior circulation stenoses, thought to be a combination of artifactual and real stenosis. Suggestion of abnormal flow also in the LVA. Plan: She is on single antiplatelet currently with aspirin. There is petechial hemorrhage noted so will hold adding second antiplatelet for now. Continue atorvastatin 80 mg and she will need improved control and adherence to her diabetes regimen and hypertensive medications. Continue therapy services. She needs speech and occupational therapy. Shey Rowan is a 78 y.o. female evaluated via audio only technology on 6/12/2020. Consent: She and/or her health care decision maker is aware that she may receive a bill for this audio only encounter, depending on her insurance coverage, and has provided verbal consent to proceed: Yes  12  Subjective:   Shey Rowan is a 78 y.o. female who was seen for Cerebrovascular Accident    I affirm this is a Patient-Initiated Episode with a Patient who has not had a related appointment within my department in the past 7 days or scheduled within the next 24 hours.     Total Time: minutes: 5-10 minutes    Note: not billable if this call serves to triage the patient into an appointment for the relevant concern      Stalin Betancourt MD